# Patient Record
Sex: FEMALE | Race: WHITE | Employment: OTHER | URBAN - METROPOLITAN AREA
[De-identification: names, ages, dates, MRNs, and addresses within clinical notes are randomized per-mention and may not be internally consistent; named-entity substitution may affect disease eponyms.]

---

## 2017-02-20 ENCOUNTER — OFFICE VISIT (OUTPATIENT)
Dept: FAMILY MEDICINE CLINIC | Age: 67
End: 2017-02-20

## 2017-02-20 VITALS
DIASTOLIC BLOOD PRESSURE: 70 MMHG | HEIGHT: 65 IN | BODY MASS INDEX: 19.19 KG/M2 | SYSTOLIC BLOOD PRESSURE: 134 MMHG | WEIGHT: 115.2 LBS

## 2017-02-20 DIAGNOSIS — Z23 NEED FOR PNEUMOCOCCAL VACCINATION: ICD-10-CM

## 2017-02-20 DIAGNOSIS — Z00.00 HEALTH CARE MAINTENANCE: Primary | ICD-10-CM

## 2017-02-20 DIAGNOSIS — J40 BRONCHITIS: ICD-10-CM

## 2017-02-20 LAB
A/G RATIO: 1.4 (ref 1.1–2.2)
ALBUMIN SERPL-MCNC: 4.1 G/DL (ref 3.4–5)
ALP BLD-CCNC: 76 U/L (ref 40–129)
ALT SERPL-CCNC: 17 U/L (ref 10–40)
ANION GAP SERPL CALCULATED.3IONS-SCNC: 17 MMOL/L (ref 3–16)
AST SERPL-CCNC: 27 U/L (ref 15–37)
BILIRUB SERPL-MCNC: <0.2 MG/DL (ref 0–1)
BILIRUBIN, POC: ABNORMAL
BLOOD URINE, POC: ABNORMAL
BUN BLDV-MCNC: 12 MG/DL (ref 7–20)
CALCIUM SERPL-MCNC: 8.7 MG/DL (ref 8.3–10.6)
CHLORIDE BLD-SCNC: 99 MMOL/L (ref 99–110)
CHOLESTEROL, TOTAL: 180 MG/DL (ref 0–199)
CLARITY, POC: ABNORMAL
CO2: 24 MMOL/L (ref 21–32)
COLOR, POC: YELLOW
CREAT SERPL-MCNC: 0.8 MG/DL (ref 0.6–1.2)
GFR AFRICAN AMERICAN: >60
GFR NON-AFRICAN AMERICAN: >60
GLOBULIN: 2.9 G/DL
GLUCOSE BLD-MCNC: 96 MG/DL (ref 70–99)
GLUCOSE URINE, POC: ABNORMAL
HCT VFR BLD CALC: 38.5 % (ref 36–48)
HDLC SERPL-MCNC: 76 MG/DL (ref 40–60)
HEMOGLOBIN: 12.6 G/DL (ref 12–16)
KETONES, POC: ABNORMAL
LDL CHOLESTEROL CALCULATED: 88 MG/DL
LEUKOCYTE EST, POC: ABNORMAL
MCH RBC QN AUTO: 28.8 PG (ref 26–34)
MCHC RBC AUTO-ENTMCNC: 32.9 G/DL (ref 31–36)
MCV RBC AUTO: 87.6 FL (ref 80–100)
NITRITE, POC: ABNORMAL
PDW BLD-RTO: 12.5 % (ref 12.4–15.4)
PH, POC: 5.5
PLATELET # BLD: 203 K/UL (ref 135–450)
PMV BLD AUTO: 8.7 FL (ref 5–10.5)
POTASSIUM SERPL-SCNC: 4.2 MMOL/L (ref 3.5–5.1)
PROTEIN, POC: ABNORMAL
RBC # BLD: 4.39 M/UL (ref 4–5.2)
SODIUM BLD-SCNC: 140 MMOL/L (ref 136–145)
SPECIFIC GRAVITY, POC: 1
TOTAL PROTEIN: 7 G/DL (ref 6.4–8.2)
TRIGL SERPL-MCNC: 80 MG/DL (ref 0–150)
TSH SERPL DL<=0.05 MIU/L-ACNC: 2.19 UIU/ML (ref 0.27–4.2)
UROBILINOGEN, POC: 0.2
VITAMIN D 25-HYDROXY: 43.2 NG/ML
VLDLC SERPL CALC-MCNC: 16 MG/DL
WBC # BLD: 8.9 K/UL (ref 4–11)

## 2017-02-20 PROCEDURE — 90670 PCV13 VACCINE IM: CPT | Performed by: FAMILY MEDICINE

## 2017-02-20 PROCEDURE — 90471 IMMUNIZATION ADMIN: CPT | Performed by: FAMILY MEDICINE

## 2017-02-20 PROCEDURE — 99397 PER PM REEVAL EST PAT 65+ YR: CPT | Performed by: FAMILY MEDICINE

## 2017-02-20 PROCEDURE — 36415 COLL VENOUS BLD VENIPUNCTURE: CPT | Performed by: FAMILY MEDICINE

## 2017-02-20 PROCEDURE — 81002 URINALYSIS NONAUTO W/O SCOPE: CPT | Performed by: FAMILY MEDICINE

## 2017-02-20 RX ORDER — AZITHROMYCIN 250 MG/1
TABLET, FILM COATED ORAL
Qty: 1 PACKET | Refills: 0 | Status: SHIPPED | OUTPATIENT
Start: 2017-02-20 | End: 2017-03-02

## 2017-02-20 ASSESSMENT — ENCOUNTER SYMPTOMS
ABDOMINAL PAIN: 0
VOMITING: 0
DIARRHEA: 0
SORE THROAT: 0
WHEEZING: 0
COUGH: 0
SHORTNESS OF BREATH: 0
BLOOD IN STOOL: 0
EYE PAIN: 0
TROUBLE SWALLOWING: 0
BACK PAIN: 0

## 2017-02-20 ASSESSMENT — PATIENT HEALTH QUESTIONNAIRE - PHQ9
2. FEELING DOWN, DEPRESSED OR HOPELESS: 0
1. LITTLE INTEREST OR PLEASURE IN DOING THINGS: 1
SUM OF ALL RESPONSES TO PHQ9 QUESTIONS 1 & 2: 1
SUM OF ALL RESPONSES TO PHQ QUESTIONS 1-9: 1

## 2017-04-20 ENCOUNTER — OFFICE VISIT (OUTPATIENT)
Dept: ENT CLINIC | Age: 67
End: 2017-04-20

## 2017-04-20 DIAGNOSIS — H91.93 HEARING LOSS, BILATERAL: Primary | ICD-10-CM

## 2017-06-20 ENCOUNTER — OFFICE VISIT (OUTPATIENT)
Dept: ENT CLINIC | Age: 67
End: 2017-06-20

## 2017-06-20 DIAGNOSIS — H90.5 HEARING LOSS, SENSORINEURAL: Primary | ICD-10-CM

## 2017-07-10 ENCOUNTER — OFFICE VISIT (OUTPATIENT)
Dept: ENT CLINIC | Age: 67
End: 2017-07-10

## 2017-07-10 DIAGNOSIS — H91.93 HEARING LOSS, BILATERAL: Primary | ICD-10-CM

## 2017-07-11 ENCOUNTER — OFFICE VISIT (OUTPATIENT)
Dept: ENT CLINIC | Age: 67
End: 2017-07-11

## 2017-07-11 VITALS — BODY MASS INDEX: 19.29 KG/M2 | HEIGHT: 66 IN | WEIGHT: 120 LBS

## 2017-07-11 DIAGNOSIS — H61.21 EXCESSIVE CERUMEN IN EAR CANAL, RIGHT: Primary | ICD-10-CM

## 2017-07-11 DIAGNOSIS — Z97.4 PRESENCE OF EXTERNAL HEARING AID: ICD-10-CM

## 2017-07-11 PROBLEM — H61.20 EXCESSIVE CERUMEN IN EAR CANAL: Status: ACTIVE | Noted: 2017-07-11

## 2017-07-11 PROCEDURE — 99213 OFFICE O/P EST LOW 20 MIN: CPT | Performed by: OTOLARYNGOLOGY

## 2017-12-29 ENCOUNTER — OFFICE VISIT (OUTPATIENT)
Dept: ENT CLINIC | Age: 67
End: 2017-12-29

## 2017-12-29 VITALS — WEIGHT: 120 LBS | SYSTOLIC BLOOD PRESSURE: 120 MMHG | DIASTOLIC BLOOD PRESSURE: 70 MMHG | BODY MASS INDEX: 19.37 KG/M2

## 2017-12-29 DIAGNOSIS — H61.21 EXCESSIVE CERUMEN IN RIGHT EAR CANAL: Primary | ICD-10-CM

## 2017-12-29 PROCEDURE — 69210 REMOVE IMPACTED EAR WAX UNI: CPT | Performed by: OTOLARYNGOLOGY

## 2017-12-29 NOTE — PROGRESS NOTES
The patient had again been having squealing when she placed her right hearing aid  The audiologist that suspected a cerumen buildup  There has been no cotton applicator use swimming. She will be in [place] this February  She denies otalgia, otorrhea, tinnitus    She also wears a left-sided hearing aid but there has been no secondary noise on that side        This is not a recurring problem               I was unable to visualize  right tympanic membrane  due to excessive cerumen and debris in the ear canal. The ear was not sensitive to touch. There was not jennifer auricular involvement. The right ear canal was cleaned of cerumen, keratinaceous and squamous debis by curettage. The patient tolerated this well. Following removal, the external auditory canal was normal. The tympanic membrane was intact and there was good aeration of the middle ear space. Immediate subjective relief noted after removal.  With reinsertion of the hearing aid, there was no further squealing     IMPRESSION: Hearing aid malfunction secondary to Cerumen impaction, right, resolved after cleaning    PLAN: Long term care of the ear canal was discussed. Earwax suggestions   1. Do not allow Q- tips to enter your ear canal   2. Place 2 or 3 drops of rubbing alcohol in each ear monthly. 3. Allow drops to stay in for a minute per side, then use cotton ball to \"mop up. \"

## 2018-01-15 ENCOUNTER — OFFICE VISIT (OUTPATIENT)
Dept: ENT CLINIC | Age: 68
End: 2018-01-15

## 2018-01-15 DIAGNOSIS — H90.3 SENSORINEURAL HEARING LOSS (SNHL) OF BOTH EARS: Primary | ICD-10-CM

## 2018-01-15 NOTE — PROGRESS NOTES
Subjective:      Patient ID: Mallory Pillai is a 75 y.o. female. HPI    Review of Systems    Objective:   Physical Exam    Assessment:      Patient in for routine hearing aid check. Doing well overall. Aids underwent thorough cleaning and inspection.       Plan:      PRN

## 2018-05-09 ENCOUNTER — OFFICE VISIT (OUTPATIENT)
Dept: AUDIOLOGY | Age: 68
End: 2018-05-09

## 2018-05-09 DIAGNOSIS — H90.3 SENSORINEURAL HEARING LOSS (SNHL) OF BOTH EARS: Primary | ICD-10-CM

## 2018-05-09 PROCEDURE — 99999 PR OFFICE/OUTPT VISIT,PROCEDURE ONLY: CPT | Performed by: AUDIOLOGIST

## 2018-05-18 ENCOUNTER — OFFICE VISIT (OUTPATIENT)
Dept: ENT CLINIC | Age: 68
End: 2018-05-18

## 2018-05-18 VITALS — HEART RATE: 72 BPM | SYSTOLIC BLOOD PRESSURE: 122 MMHG | DIASTOLIC BLOOD PRESSURE: 64 MMHG

## 2018-05-18 DIAGNOSIS — Z97.4 PRESENCE OF EXTERNAL HEARING AID: ICD-10-CM

## 2018-05-18 DIAGNOSIS — H61.21 EXCESSIVE CERUMEN IN RIGHT EAR CANAL: Primary | ICD-10-CM

## 2018-05-18 PROCEDURE — 69210 REMOVE IMPACTED EAR WAX UNI: CPT | Performed by: OTOLARYNGOLOGY

## 2018-07-25 ENCOUNTER — OFFICE VISIT (OUTPATIENT)
Dept: ENT CLINIC | Age: 68
End: 2018-07-25

## 2018-07-25 VITALS
BODY MASS INDEX: 18.99 KG/M2 | WEIGHT: 114 LBS | OXYGEN SATURATION: 97 % | HEIGHT: 65 IN | SYSTOLIC BLOOD PRESSURE: 124 MMHG | DIASTOLIC BLOOD PRESSURE: 72 MMHG | HEART RATE: 64 BPM

## 2018-07-25 DIAGNOSIS — Z97.4 PRESENCE OF EXTERNAL HEARING AID: Primary | ICD-10-CM

## 2018-07-25 PROCEDURE — 99212 OFFICE O/P EST SF 10 MIN: CPT | Performed by: OTOLARYNGOLOGY

## 2018-10-26 ENCOUNTER — TELEPHONE (OUTPATIENT)
Dept: FAMILY MEDICINE CLINIC | Age: 68
End: 2018-10-26

## 2018-11-06 ENCOUNTER — OFFICE VISIT (OUTPATIENT)
Dept: ENT CLINIC | Age: 68
End: 2018-11-06
Payer: MEDICARE

## 2018-11-06 VITALS
OXYGEN SATURATION: 98 % | SYSTOLIC BLOOD PRESSURE: 138 MMHG | HEIGHT: 65 IN | RESPIRATION RATE: 16 BRPM | DIASTOLIC BLOOD PRESSURE: 76 MMHG | BODY MASS INDEX: 18.99 KG/M2 | WEIGHT: 114 LBS | HEART RATE: 63 BPM

## 2018-11-06 DIAGNOSIS — H61.23 EXCESSIVE CERUMEN IN BOTH EAR CANALS: Primary | ICD-10-CM

## 2018-11-06 PROCEDURE — 69210 REMOVE IMPACTED EAR WAX UNI: CPT | Performed by: OTOLARYNGOLOGY

## 2018-11-07 ENCOUNTER — TELEPHONE (OUTPATIENT)
Dept: ENT CLINIC | Age: 68
End: 2018-11-07

## 2018-11-08 ENCOUNTER — NURSE ONLY (OUTPATIENT)
Dept: FAMILY MEDICINE CLINIC | Age: 68
End: 2018-11-08
Payer: MEDICARE

## 2018-11-08 DIAGNOSIS — Z00.00 HEALTH CARE MAINTENANCE: ICD-10-CM

## 2018-11-08 DIAGNOSIS — Z00.00 ANNUAL PHYSICAL EXAM: Primary | ICD-10-CM

## 2018-11-08 DIAGNOSIS — R92.0 BREAST MICROCALCIFICATIONS: ICD-10-CM

## 2018-11-08 LAB
A/G RATIO: 2.4 (ref 1.1–2.2)
ALBUMIN SERPL-MCNC: 4.7 G/DL (ref 3.4–5)
ALP BLD-CCNC: 78 U/L (ref 40–129)
ALT SERPL-CCNC: 16 U/L (ref 10–40)
ANION GAP SERPL CALCULATED.3IONS-SCNC: 12 MMOL/L (ref 3–16)
AST SERPL-CCNC: 27 U/L (ref 15–37)
BILIRUB SERPL-MCNC: 0.3 MG/DL (ref 0–1)
BILIRUBIN, POC: NORMAL
BLOOD URINE, POC: NORMAL
BUN BLDV-MCNC: 12 MG/DL (ref 7–20)
CALCIUM SERPL-MCNC: 9.2 MG/DL (ref 8.3–10.6)
CHLORIDE BLD-SCNC: 105 MMOL/L (ref 99–110)
CHOLESTEROL, TOTAL: 198 MG/DL (ref 0–199)
CLARITY, POC: NORMAL
CO2: 27 MMOL/L (ref 21–32)
COLOR, POC: NORMAL
CREAT SERPL-MCNC: 0.7 MG/DL (ref 0.6–1.2)
GFR AFRICAN AMERICAN: >60
GFR NON-AFRICAN AMERICAN: >60
GLOBULIN: 2 G/DL
GLUCOSE BLD-MCNC: 85 MG/DL (ref 70–99)
GLUCOSE URINE, POC: NORMAL
HCT VFR BLD CALC: 39.5 % (ref 36–48)
HDLC SERPL-MCNC: 77 MG/DL (ref 40–60)
HEMOGLOBIN: 12.9 G/DL (ref 12–16)
HEPATITIS C ANTIBODY INTERPRETATION: NORMAL
KETONES, POC: NORMAL
LDL CHOLESTEROL CALCULATED: 109 MG/DL
LEUKOCYTE EST, POC: NORMAL
MCH RBC QN AUTO: 29.2 PG (ref 26–34)
MCHC RBC AUTO-ENTMCNC: 32.8 G/DL (ref 31–36)
MCV RBC AUTO: 88.9 FL (ref 80–100)
NITRITE, POC: NORMAL
PDW BLD-RTO: 13.4 % (ref 12.4–15.4)
PH, POC: 6
PLATELET # BLD: 214 K/UL (ref 135–450)
PMV BLD AUTO: 8.4 FL (ref 5–10.5)
POTASSIUM SERPL-SCNC: 4.9 MMOL/L (ref 3.5–5.1)
PROTEIN, POC: NORMAL
RBC # BLD: 4.44 M/UL (ref 4–5.2)
SODIUM BLD-SCNC: 144 MMOL/L (ref 136–145)
SPECIFIC GRAVITY, POC: 1.01
TOTAL PROTEIN: 6.7 G/DL (ref 6.4–8.2)
TRIGL SERPL-MCNC: 61 MG/DL (ref 0–150)
TSH REFLEX: 1.84 UIU/ML (ref 0.27–4.2)
UROBILINOGEN, POC: 0.2
VLDLC SERPL CALC-MCNC: 12 MG/DL
WBC # BLD: 4.8 K/UL (ref 4–11)

## 2018-11-08 PROCEDURE — 36415 COLL VENOUS BLD VENIPUNCTURE: CPT | Performed by: FAMILY MEDICINE

## 2018-11-08 PROCEDURE — 81002 URINALYSIS NONAUTO W/O SCOPE: CPT | Performed by: FAMILY MEDICINE

## 2018-11-09 ENCOUNTER — OFFICE VISIT (OUTPATIENT)
Dept: ENT CLINIC | Age: 68
End: 2018-11-09
Payer: MEDICARE

## 2018-11-09 VITALS — HEART RATE: 57 BPM | DIASTOLIC BLOOD PRESSURE: 80 MMHG | OXYGEN SATURATION: 97 % | SYSTOLIC BLOOD PRESSURE: 160 MMHG

## 2018-11-09 DIAGNOSIS — S00.411D ABRASION OF RIGHT EAR CANAL, SUBSEQUENT ENCOUNTER: Primary | ICD-10-CM

## 2018-11-09 PROBLEM — S00.411A ABRASION OF RIGHT EAR CANAL: Status: ACTIVE | Noted: 2018-11-09

## 2018-11-09 PROCEDURE — 99213 OFFICE O/P EST LOW 20 MIN: CPT | Performed by: OTOLARYNGOLOGY

## 2018-11-12 ENCOUNTER — PROCEDURE VISIT (OUTPATIENT)
Dept: AUDIOLOGY | Age: 68
End: 2018-11-12
Payer: MEDICARE

## 2018-11-12 DIAGNOSIS — H90.3 SENSORINEURAL HEARING LOSS (SNHL) OF BOTH EARS: Primary | ICD-10-CM

## 2018-11-12 PROCEDURE — 92568 ACOUSTIC REFL THRESHOLD TST: CPT | Performed by: AUDIOLOGIST

## 2018-11-12 PROCEDURE — 92557 COMPREHENSIVE HEARING TEST: CPT | Performed by: AUDIOLOGIST

## 2018-11-13 ENCOUNTER — OFFICE VISIT (OUTPATIENT)
Dept: AUDIOLOGY | Age: 68
End: 2018-11-13

## 2018-11-13 ENCOUNTER — OFFICE VISIT (OUTPATIENT)
Dept: FAMILY MEDICINE CLINIC | Age: 68
End: 2018-11-13
Payer: MEDICARE

## 2018-11-13 VITALS
HEIGHT: 65 IN | SYSTOLIC BLOOD PRESSURE: 142 MMHG | WEIGHT: 120 LBS | DIASTOLIC BLOOD PRESSURE: 74 MMHG | BODY MASS INDEX: 19.99 KG/M2

## 2018-11-13 DIAGNOSIS — Z23 NEED FOR PNEUMOCOCCAL VACCINATION: ICD-10-CM

## 2018-11-13 DIAGNOSIS — Z00.00 HEALTHCARE MAINTENANCE: Primary | ICD-10-CM

## 2018-11-13 DIAGNOSIS — Z23 NEEDS FLU SHOT: ICD-10-CM

## 2018-11-13 DIAGNOSIS — H90.3 SENSORINEURAL HEARING LOSS OF BOTH EARS: Primary | ICD-10-CM

## 2018-11-13 PROBLEM — S00.411A ABRASION OF RIGHT EAR CANAL: Status: RESOLVED | Noted: 2018-11-09 | Resolved: 2018-11-13

## 2018-11-13 PROCEDURE — G0008 ADMIN INFLUENZA VIRUS VAC: HCPCS | Performed by: FAMILY MEDICINE

## 2018-11-13 PROCEDURE — 90732 PPSV23 VACC 2 YRS+ SUBQ/IM: CPT | Performed by: FAMILY MEDICINE

## 2018-11-13 PROCEDURE — 99397 PER PM REEVAL EST PAT 65+ YR: CPT | Performed by: FAMILY MEDICINE

## 2018-11-13 PROCEDURE — 99999 PR OFFICE/OUTPT VISIT,PROCEDURE ONLY: CPT | Performed by: AUDIOLOGIST

## 2018-11-13 PROCEDURE — G0009 ADMIN PNEUMOCOCCAL VACCINE: HCPCS | Performed by: FAMILY MEDICINE

## 2018-11-13 PROCEDURE — 90662 IIV NO PRSV INCREASED AG IM: CPT | Performed by: FAMILY MEDICINE

## 2018-11-13 ASSESSMENT — ENCOUNTER SYMPTOMS
SHORTNESS OF BREATH: 0
BLOOD IN STOOL: 0
DIARRHEA: 0
ABDOMINAL PAIN: 0
BACK PAIN: 0
WHEEZING: 0
COUGH: 0
SORE THROAT: 0
VOMITING: 0
TROUBLE SWALLOWING: 0
EYE PAIN: 0

## 2018-11-13 ASSESSMENT — PATIENT HEALTH QUESTIONNAIRE - PHQ9
SUM OF ALL RESPONSES TO PHQ QUESTIONS 1-9: 0
SUM OF ALL RESPONSES TO PHQ9 QUESTIONS 1 & 2: 0
2. FEELING DOWN, DEPRESSED OR HOPELESS: 0
1. LITTLE INTEREST OR PLEASURE IN DOING THINGS: 0
SUM OF ALL RESPONSES TO PHQ QUESTIONS 1-9: 0

## 2018-11-13 NOTE — PROGRESS NOTES
Vaccine Information Sheet, \"Influenza - Inactivated\"  given to Kaelyn Padilla, or parent/legal guardian of  Kaelyn Padilla and verbalized understanding. Patient responses:    Have you ever had a reaction to a flu vaccine? No  Are you able to eat eggs without adverse effects? Yes  Do you have any current illness? No  Have you ever had [place] Syndrome? No    Flu vaccine given per order. Please see immunization tab.

## 2019-01-30 ENCOUNTER — OFFICE VISIT (OUTPATIENT)
Dept: ENT CLINIC | Age: 69
End: 2019-01-30
Payer: MEDICARE

## 2019-01-30 VITALS — SYSTOLIC BLOOD PRESSURE: 138 MMHG | OXYGEN SATURATION: 98 % | DIASTOLIC BLOOD PRESSURE: 72 MMHG | HEART RATE: 65 BPM

## 2019-01-30 DIAGNOSIS — H61.21 IMPACTED CERUMEN OF RIGHT EAR: Primary | ICD-10-CM

## 2019-01-30 PROCEDURE — 69210 REMOVE IMPACTED EAR WAX UNI: CPT | Performed by: OTOLARYNGOLOGY

## 2019-02-04 ENCOUNTER — TELEPHONE (OUTPATIENT)
Dept: FAMILY MEDICINE CLINIC | Age: 69
End: 2019-02-04

## 2019-05-20 RX ORDER — ATOVAQUONE AND PROGUANIL HYDROCHLORIDE 250; 100 MG/1; MG/1
1 TABLET, FILM COATED ORAL DAILY
Qty: 30 TABLET | Refills: 0 | Status: SHIPPED | OUTPATIENT
Start: 2019-05-20 | End: 2019-08-06

## 2019-05-28 ENCOUNTER — OFFICE VISIT (OUTPATIENT)
Dept: ENT CLINIC | Age: 69
End: 2019-05-28
Payer: MEDICARE

## 2019-05-28 VITALS — HEART RATE: 82 BPM | OXYGEN SATURATION: 98 % | DIASTOLIC BLOOD PRESSURE: 70 MMHG | SYSTOLIC BLOOD PRESSURE: 136 MMHG

## 2019-05-28 DIAGNOSIS — H61.21 IMPACTED CERUMEN OF RIGHT EAR: Primary | ICD-10-CM

## 2019-05-28 DIAGNOSIS — H90.3 SENSORINEURAL HEARING LOSS (SNHL) OF BOTH EARS: ICD-10-CM

## 2019-05-28 PROCEDURE — 69210 REMOVE IMPACTED EAR WAX UNI: CPT | Performed by: OTOLARYNGOLOGY

## 2019-05-28 PROCEDURE — 99212 OFFICE O/P EST SF 10 MIN: CPT | Performed by: OTOLARYNGOLOGY

## 2019-08-06 ENCOUNTER — OFFICE VISIT (OUTPATIENT)
Dept: ENT CLINIC | Age: 69
End: 2019-08-06
Payer: MEDICARE

## 2019-08-06 VITALS — SYSTOLIC BLOOD PRESSURE: 132 MMHG | HEART RATE: 76 BPM | OXYGEN SATURATION: 94 % | DIASTOLIC BLOOD PRESSURE: 70 MMHG

## 2019-08-06 DIAGNOSIS — H61.23 BILATERAL IMPACTED CERUMEN: Primary | ICD-10-CM

## 2019-08-06 PROCEDURE — 69210 REMOVE IMPACTED EAR WAX UNI: CPT | Performed by: OTOLARYNGOLOGY

## 2019-08-06 NOTE — PROGRESS NOTES
Patient wears hearing aids and is concerned about the potential of cerumen accumulation somewhat greater on the right than the left. No pain is been noted and there is been no change in hearing or tinnitus. No vertigo has been noted. Currently, she appears in no obvious acute distress. Ear examination does reveal cerumen presence in both ear canals removed with irrigation, suctioning, curettage, and cleaning with peroxide. Upon removal, the tympanic membranes appear normal and ear canals are now clear. Oral examination reveals normal appearance of the mucosa and no lesions noted. The nasal mucosa is mildly edematous consistent with some allergy but no purulence is apparent and there is no obstruction present. The neck is free of any adenopathy, mass, thyroid enlargement. Patient will return as needed.

## 2019-08-26 ENCOUNTER — TELEPHONE (OUTPATIENT)
Dept: ENT CLINIC | Age: 69
End: 2019-08-26

## 2019-08-26 DIAGNOSIS — T70.29XA BAROTRAUMA, INITIAL ENCOUNTER: Primary | ICD-10-CM

## 2019-08-26 RX ORDER — METHYLPREDNISOLONE 4 MG/1
4 TABLET ORAL SEE ADMIN INSTRUCTIONS
Qty: 1 KIT | Refills: 0 | Status: SHIPPED | OUTPATIENT
Start: 2019-08-26 | End: 2019-12-18

## 2019-08-26 NOTE — TELEPHONE ENCOUNTER
PT called again, is on malaria pills atovaquone -proguanil and for typhoid meds -vivotis , also supposed to get another hep a&b shot end of the month. She wants to be sure its ok to take the steroid you gave her with these meds.

## 2019-09-30 ENCOUNTER — OFFICE VISIT (OUTPATIENT)
Dept: FAMILY MEDICINE CLINIC | Age: 69
End: 2019-09-30
Payer: MEDICARE

## 2019-09-30 VITALS
SYSTOLIC BLOOD PRESSURE: 140 MMHG | DIASTOLIC BLOOD PRESSURE: 80 MMHG | HEIGHT: 65 IN | BODY MASS INDEX: 19.33 KG/M2 | WEIGHT: 116 LBS

## 2019-09-30 DIAGNOSIS — S56.911A STRAIN OF RIGHT FOREARM, INITIAL ENCOUNTER: Primary | ICD-10-CM

## 2019-09-30 DIAGNOSIS — N30.00 ACUTE CYSTITIS WITHOUT HEMATURIA: ICD-10-CM

## 2019-09-30 LAB
BILIRUBIN, POC: ABNORMAL
BLOOD URINE, POC: ABNORMAL
CLARITY, POC: ABNORMAL
COLOR, POC: YELLOW
GLUCOSE URINE, POC: ABNORMAL
KETONES, POC: ABNORMAL
LEUKOCYTE EST, POC: ABNORMAL
NITRITE, POC: ABNORMAL
PH, POC: 6
PROTEIN, POC: 30
SPECIFIC GRAVITY, POC: 1.02
UROBILINOGEN, POC: 0.2

## 2019-09-30 PROCEDURE — 99213 OFFICE O/P EST LOW 20 MIN: CPT | Performed by: FAMILY MEDICINE

## 2019-09-30 PROCEDURE — 81002 URINALYSIS NONAUTO W/O SCOPE: CPT | Performed by: FAMILY MEDICINE

## 2019-09-30 RX ORDER — SULFAMETHOXAZOLE AND TRIMETHOPRIM 800; 160 MG/1; MG/1
1 TABLET ORAL 2 TIMES DAILY
Qty: 14 TABLET | Refills: 0 | Status: SHIPPED | OUTPATIENT
Start: 2019-09-30 | End: 2019-10-07

## 2019-09-30 RX ORDER — IBUPROFEN 600 MG/1
600 TABLET ORAL 3 TIMES DAILY PRN
Qty: 30 TABLET | Refills: 0 | Status: SHIPPED | OUTPATIENT
Start: 2019-09-30 | End: 2019-12-18

## 2019-09-30 ASSESSMENT — PATIENT HEALTH QUESTIONNAIRE - PHQ9
1. LITTLE INTEREST OR PLEASURE IN DOING THINGS: 0
2. FEELING DOWN, DEPRESSED OR HOPELESS: 0
SUM OF ALL RESPONSES TO PHQ9 QUESTIONS 1 & 2: 0
SUM OF ALL RESPONSES TO PHQ QUESTIONS 1-9: 0
SUM OF ALL RESPONSES TO PHQ QUESTIONS 1-9: 0

## 2019-09-30 ASSESSMENT — ENCOUNTER SYMPTOMS
COUGH: 0
WHEEZING: 0
EYE PAIN: 0
ABDOMINAL PAIN: 0
SHORTNESS OF BREATH: 0

## 2019-09-30 NOTE — PROGRESS NOTES
Subjective:      Patient ID: Hui Niño is a 78 y.o. female. HPI  Forearm injury  Soreness in forearm    Recent trip [place]    Had to lift suitcase in overhead compartment freq    Now pain in flexor extensor muscles right arm          Poss uti    ua has large wbc and rbc    Dysuria onset 4 day ago   freq and urgency   noo fevers or chills    Review of Systems   Constitutional: Negative for appetite change, fatigue and unexpected weight change. Eyes: Negative for pain and visual disturbance. Respiratory: Negative for cough, shortness of breath and wheezing. Cardiovascular: Negative for chest pain, palpitations and leg swelling. Gastrointestinal: Negative for abdominal pain. Endocrine: Negative for polyuria. Genitourinary: Positive for dysuria and frequency. Negative for difficulty urinating. Musculoskeletal: Positive for myalgias. Neurological: Negative for speech difficulty, numbness and headaches. Psychiatric/Behavioral: Negative for confusion and sleep disturbance. A complete 14 point  review of systems was completed; pertinent positives are noted in HPI    Vitals:    09/30/19 0939   Weight: 116 lb (52.6 kg)   Height: 5' 5\" (1.651 m)     Body mass index is 19.3 kg/m². Wt Readings from Last 3 Encounters:   09/30/19 116 lb (52.6 kg)   11/13/18 120 lb (54.4 kg)   11/06/18 114 lb (51.7 kg)     BP Readings from Last 3 Encounters:   08/06/19 132/70   05/28/19 136/70   01/30/19 138/72        Ht 5' 5\" (1.651 m)   Wt 116 lb (52.6 kg)   BMI 19.30 kg/m²    Objective:   Physical Exam   Constitutional: She is oriented to person, place, and time. She appears well-nourished. No distress. Cardiovascular: Normal rate and regular rhythm. Pulmonary/Chest: Effort normal and breath sounds normal.   Abdominal: Soft. Bowel sounds are normal. There is no tenderness.    No flank pain     Musculoskeletal:   Right forearm tender at lateral and medical epicondyle   good wrist and elbow motpon   pain

## 2019-10-02 LAB
ORGANISM: ABNORMAL
URINE CULTURE, ROUTINE: ABNORMAL

## 2019-10-21 ENCOUNTER — TELEPHONE (OUTPATIENT)
Dept: ORTHOPEDIC SURGERY | Age: 69
End: 2019-10-21

## 2019-12-13 ENCOUNTER — TELEPHONE (OUTPATIENT)
Dept: FAMILY MEDICINE CLINIC | Age: 69
End: 2019-12-13

## 2019-12-13 ENCOUNTER — TELEPHONE (OUTPATIENT)
Dept: ENT CLINIC | Age: 69
End: 2019-12-13

## 2019-12-13 NOTE — TELEPHONE ENCOUNTER
Patient called , she is saying that she is having Dizziness for the first time     It happened today while she was at [employer] class, she started spinning bad,     Mainly her right side,  She is concerned about trying to sleep tonight? She was advised that Dr Delwyn Severs was not in the office today,      Shahnaz Greene patient has an appointment for next wed?   Please advise    WG

## 2019-12-18 ENCOUNTER — OFFICE VISIT (OUTPATIENT)
Dept: ENT CLINIC | Age: 69
End: 2019-12-18
Payer: MEDICARE

## 2019-12-18 VITALS — OXYGEN SATURATION: 96 % | SYSTOLIC BLOOD PRESSURE: 150 MMHG | HEART RATE: 65 BPM | DIASTOLIC BLOOD PRESSURE: 82 MMHG

## 2019-12-18 DIAGNOSIS — H61.23 EXCESSIVE CERUMEN IN BOTH EAR CANALS: Primary | ICD-10-CM

## 2019-12-18 DIAGNOSIS — H81.10 BENIGN PAROXYSMAL POSITIONAL VERTIGO, UNSPECIFIED LATERALITY: ICD-10-CM

## 2019-12-18 PROCEDURE — 69210 REMOVE IMPACTED EAR WAX UNI: CPT | Performed by: OTOLARYNGOLOGY

## 2019-12-18 PROCEDURE — 99212 OFFICE O/P EST SF 10 MIN: CPT | Performed by: OTOLARYNGOLOGY

## 2020-06-25 ENCOUNTER — OFFICE VISIT (OUTPATIENT)
Dept: ENT CLINIC | Age: 70
End: 2020-06-25
Payer: MEDICARE

## 2020-06-25 VITALS — DIASTOLIC BLOOD PRESSURE: 78 MMHG | SYSTOLIC BLOOD PRESSURE: 175 MMHG | HEART RATE: 79 BPM | TEMPERATURE: 97.8 F

## 2020-06-25 PROCEDURE — 69210 REMOVE IMPACTED EAR WAX UNI: CPT | Performed by: OTOLARYNGOLOGY

## 2020-06-26 NOTE — PROGRESS NOTES
Patient has been having routine cleaning of her ears secondary to accumulation of cerumen in the past.  She is feeling that it is now presents once again with some limitation of her hearing. There is no change in tinnitus and she has had no vertigo. There is been no fever. Currently, she appears in no acute distress. Ear examination does reveal cerumen present in both ear canals occluding them. This is removed with a combination of irrigation, suctioning, curettage, and cleaning with peroxide. Upon removal, the tympanic membranes appear normal and the ear canals are now clear. Oral examination remains unremarkable. The nasal mucosa is normal as well. The neck is free of any adenopathy, mass, thyroid enlargement. She will return as needed for future removal of cerumen.

## 2020-10-05 ENCOUNTER — TELEPHONE (OUTPATIENT)
Dept: ENT CLINIC | Age: 70
End: 2020-10-05

## 2020-10-05 NOTE — TELEPHONE ENCOUNTER
Patient just stopped in the office, she says that she has LM's for Dr Nesha Polanco,    She is in need of domes,  Little black rubber piece, for her HA's

## 2020-11-19 ENCOUNTER — OFFICE VISIT (OUTPATIENT)
Dept: ENT CLINIC | Age: 70
End: 2020-11-19
Payer: MEDICARE

## 2020-11-19 VITALS
HEART RATE: 97 BPM | BODY MASS INDEX: 19.33 KG/M2 | TEMPERATURE: 96.9 F | WEIGHT: 116 LBS | DIASTOLIC BLOOD PRESSURE: 81 MMHG | RESPIRATION RATE: 16 BRPM | OXYGEN SATURATION: 96 % | HEIGHT: 65 IN | SYSTOLIC BLOOD PRESSURE: 170 MMHG

## 2020-11-19 PROCEDURE — 69210 REMOVE IMPACTED EAR WAX UNI: CPT | Performed by: OTOLARYNGOLOGY

## 2020-11-20 ENCOUNTER — TELEPHONE (OUTPATIENT)
Dept: FAMILY MEDICINE CLINIC | Age: 70
End: 2020-11-20

## 2020-11-20 NOTE — TELEPHONE ENCOUNTER
Patient request [address] lab orders to be placed as future for her upcoming physical on 12/8/20. Please call patient once lab orders placed.

## 2020-11-30 DIAGNOSIS — Z79.899 ENCOUNTER FOR LONG-TERM (CURRENT) USE OF MEDICATIONS: ICD-10-CM

## 2020-11-30 DIAGNOSIS — M85.80 OSTEOPENIA, UNSPECIFIED LOCATION: ICD-10-CM

## 2020-11-30 LAB
A/G RATIO: 2 (ref 1.1–2.2)
ALBUMIN SERPL-MCNC: 4.5 G/DL (ref 3.4–5)
ALP BLD-CCNC: 87 U/L (ref 40–129)
ALT SERPL-CCNC: 14 U/L (ref 10–40)
ANION GAP SERPL CALCULATED.3IONS-SCNC: 9 MMOL/L (ref 3–16)
AST SERPL-CCNC: 23 U/L (ref 15–37)
BACTERIA: ABNORMAL /HPF
BILIRUB SERPL-MCNC: 0.3 MG/DL (ref 0–1)
BILIRUBIN URINE: NEGATIVE
BLOOD, URINE: ABNORMAL
BUN BLDV-MCNC: 14 MG/DL (ref 7–20)
CALCIUM SERPL-MCNC: 9.4 MG/DL (ref 8.3–10.6)
CHLORIDE BLD-SCNC: 102 MMOL/L (ref 99–110)
CHOLESTEROL, TOTAL: 197 MG/DL (ref 0–199)
CLARITY: ABNORMAL
CO2: 30 MMOL/L (ref 21–32)
COLOR: YELLOW
CREAT SERPL-MCNC: 0.6 MG/DL (ref 0.6–1.2)
EPITHELIAL CELLS, UA: 1 /HPF (ref 0–5)
GFR AFRICAN AMERICAN: >60
GFR NON-AFRICAN AMERICAN: >60
GLOBULIN: 2.3 G/DL
GLUCOSE BLD-MCNC: 96 MG/DL (ref 70–99)
GLUCOSE URINE: NEGATIVE MG/DL
HCT VFR BLD CALC: 40.2 % (ref 36–48)
HDLC SERPL-MCNC: 77 MG/DL (ref 40–60)
HEMOGLOBIN: 13.7 G/DL (ref 12–16)
HYALINE CASTS: 0 /LPF (ref 0–8)
KETONES, URINE: NEGATIVE MG/DL
LDL CHOLESTEROL CALCULATED: 108 MG/DL
LEUKOCYTE ESTERASE, URINE: ABNORMAL
MCH RBC QN AUTO: 30.4 PG (ref 26–34)
MCHC RBC AUTO-ENTMCNC: 34 G/DL (ref 31–36)
MCV RBC AUTO: 89.4 FL (ref 80–100)
MICROSCOPIC EXAMINATION: YES
NITRITE, URINE: POSITIVE
PDW BLD-RTO: 12.6 % (ref 12.4–15.4)
PH UA: 6.5 (ref 5–8)
PLATELET # BLD: 189 K/UL (ref 135–450)
PMV BLD AUTO: 8.7 FL (ref 5–10.5)
POTASSIUM SERPL-SCNC: 3.9 MMOL/L (ref 3.5–5.1)
PROTEIN UA: NEGATIVE MG/DL
RBC # BLD: 4.49 M/UL (ref 4–5.2)
RBC UA: 5 /HPF (ref 0–4)
SODIUM BLD-SCNC: 141 MMOL/L (ref 136–145)
SPECIFIC GRAVITY UA: 1.01 (ref 1–1.03)
TOTAL PROTEIN: 6.8 G/DL (ref 6.4–8.2)
TRIGL SERPL-MCNC: 61 MG/DL (ref 0–150)
TSH SERPL DL<=0.05 MIU/L-ACNC: 3.27 UIU/ML (ref 0.27–4.2)
URINE TYPE: ABNORMAL
UROBILINOGEN, URINE: 0.2 E.U./DL
VITAMIN D 25-HYDROXY: 38.4 NG/ML
VLDLC SERPL CALC-MCNC: 12 MG/DL
WBC # BLD: 5.7 K/UL (ref 4–11)
WBC UA: 67 /HPF (ref 0–5)

## 2020-12-01 RX ORDER — SULFAMETHOXAZOLE AND TRIMETHOPRIM 800; 160 MG/1; MG/1
1 TABLET ORAL 2 TIMES DAILY
Qty: 10 TABLET | Refills: 0 | Status: SHIPPED | OUTPATIENT
Start: 2020-12-01 | End: 2020-12-06

## 2020-12-07 ASSESSMENT — ENCOUNTER SYMPTOMS
SHORTNESS OF BREATH: 0
TROUBLE SWALLOWING: 0
ABDOMINAL PAIN: 0
DIARRHEA: 0
WHEEZING: 0
COUGH: 0
EYE PAIN: 0
BACK PAIN: 0
VOMITING: 0
BLOOD IN STOOL: 0
SORE THROAT: 0

## 2020-12-07 NOTE — PROGRESS NOTES
Subjective:      Patient ID: Alondra Rose is a 69 y.o. female. HPI  Health maintenence exam  Been doing well   no new complaints    Denies chst apin or sob no abd pain no bowel changes    No Known Allergies  No current outpatient medications on file. No current facility-administered medications for this visit. Past Medical History:   Diagnosis Date    Basal cell cancer     Preventative health care     Raynaud's disease      Past Surgical History:   Procedure Laterality Date    COLONOSCOPY  2008    FINGER TRIGGER RELEASE Right 5/27/14    ring finger    SKIN CANCER EXCISION       Social History     Tobacco Use    Smoking status: Never Smoker    Smokeless tobacco: Never Used   Substance Use Topics    Alcohol use: Yes     Comment: occassionally    Drug use: No     Family History   Problem Relation Age of Onset    High Blood Pressure Unknown     Heart Disease Unknown          Review of Systems   Constitutional: Negative for appetite change, fatigue and unexpected weight change. HENT: Negative for congestion, postnasal drip, sore throat and trouble swallowing. Eyes: Negative for pain and visual disturbance. Respiratory: Negative for cough, shortness of breath and wheezing. Cardiovascular: Negative for chest pain and palpitations. Gastrointestinal: Negative for abdominal pain, blood in stool, diarrhea and vomiting. Genitourinary: Negative for difficulty urinating, dysuria, frequency and urgency. Musculoskeletal: Negative for arthralgias, back pain, joint swelling and neck pain. Skin: Negative for rash. Neurological: Negative for dizziness, weakness and light-headedness. Hematological: Negative for adenopathy. Does not bruise/bleed easily. Psychiatric/Behavioral: Negative for sleep disturbance. The patient is not nervous/anxious.       A complete 14 point  review of systems was completed; pertinent positives are noted in HPI    Vitals:    12/08/20 1028   BP: (!) 144/70   Temp: 97.3 °F (36.3 °C)   Weight: 114 lb 9.6 oz (52 kg)   Height: 5' 5\" (1.651 m)     Body mass index is 19.07 kg/m². Wt Readings from Last 3 Encounters:   12/08/20 114 lb 9.6 oz (52 kg)   11/19/20 116 lb (52.6 kg)   09/30/19 116 lb (52.6 kg)     BP Readings from Last 3 Encounters:   12/08/20 (!) 144/70   11/19/20 (!) 170/81   06/25/20 (!) 175/78        BP (!) 144/70   Temp 97.3 °F (36.3 °C)   Ht 5' 5\" (1.651 m)   Wt 114 lb 9.6 oz (52 kg)   BMI 19.07 kg/m²    Objective:   Physical Exam  Constitutional:       General: She is not in acute distress. Appearance: She is well-developed. HENT:      Right Ear: External ear normal.      Left Ear: External ear normal.      Nose: Nose normal.   Eyes:      General: No scleral icterus. Conjunctiva/sclera: Conjunctivae normal.   Neck:      Musculoskeletal: Neck supple. Thyroid: No thyromegaly. Cardiovascular:      Rate and Rhythm: Normal rate and regular rhythm. Heart sounds: Normal heart sounds. No murmur. Pulmonary:      Effort: Pulmonary effort is normal. No respiratory distress. Breath sounds: Normal breath sounds. Abdominal:      General: Bowel sounds are normal.      Palpations: Abdomen is soft. Tenderness: There is no abdominal tenderness. Lymphadenopathy:      Cervical: No cervical adenopathy. Skin:     General: Skin is warm. Findings: No rash. Neurological:      Mental Status: She is alert and oriented to person, place, and time. Psychiatric:         Thought Content:  Thought content normal.         Assessment:      Assessment/Plan:  Wilma Salinas was seen today for annual exam.    Diagnoses and all orders for this visit:    Healthcare maintenance, good health    Presbycusis of both ears    Acute cystitis with hematuria, resolved with bactrim            Plan:      Reviewed lab results with patient in detail, both normal and abnormal and recommended plan of care  No need for meds  rto annually          [address], DO

## 2020-12-08 ENCOUNTER — OFFICE VISIT (OUTPATIENT)
Dept: FAMILY MEDICINE CLINIC | Age: 70
End: 2020-12-08
Payer: MEDICARE

## 2020-12-08 VITALS
TEMPERATURE: 97.3 F | HEIGHT: 65 IN | WEIGHT: 114.6 LBS | SYSTOLIC BLOOD PRESSURE: 144 MMHG | BODY MASS INDEX: 19.09 KG/M2 | DIASTOLIC BLOOD PRESSURE: 70 MMHG

## 2020-12-08 LAB
BILIRUBIN, POC: ABNORMAL
BLOOD URINE, POC: ABNORMAL
CLARITY, POC: ABNORMAL
COLOR, POC: YELLOW
GLUCOSE URINE, POC: ABNORMAL
KETONES, POC: ABNORMAL
LEUKOCYTE EST, POC: ABNORMAL
NITRITE, POC: ABNORMAL
PH, POC: 5.5
PROTEIN, POC: ABNORMAL
SPECIFIC GRAVITY, POC: 1.01
UROBILINOGEN, POC: 0.2

## 2020-12-08 PROCEDURE — 81002 URINALYSIS NONAUTO W/O SCOPE: CPT | Performed by: FAMILY MEDICINE

## 2020-12-08 PROCEDURE — 99397 PER PM REEVAL EST PAT 65+ YR: CPT | Performed by: FAMILY MEDICINE

## 2020-12-08 ASSESSMENT — PATIENT HEALTH QUESTIONNAIRE - PHQ9
SUM OF ALL RESPONSES TO PHQ QUESTIONS 1-9: 0
1. LITTLE INTEREST OR PLEASURE IN DOING THINGS: 0
2. FEELING DOWN, DEPRESSED OR HOPELESS: 0
SUM OF ALL RESPONSES TO PHQ QUESTIONS 1-9: 0
SUM OF ALL RESPONSES TO PHQ QUESTIONS 1-9: 0
SUM OF ALL RESPONSES TO PHQ9 QUESTIONS 1 & 2: 0

## 2020-12-11 ENCOUNTER — TELEPHONE (OUTPATIENT)
Dept: FAMILY MEDICINE CLINIC | Age: 70
End: 2020-12-11

## 2020-12-11 NOTE — TELEPHONE ENCOUNTER
Pt called back  States she is going to [facility]  I told her to call the to do Prior auth for PT

## 2020-12-11 NOTE — TELEPHONE ENCOUNTER
Patient states [facility] is requiring a pre-authorization for outpatient Physical Therapy. Patient is wanting to start PT asap. Patient requesting a returned call once pre-auth has gone through.

## 2020-12-28 ENCOUNTER — HOSPITAL ENCOUNTER (OUTPATIENT)
Dept: PHYSICAL THERAPY | Age: 70
Setting detail: THERAPIES SERIES
Discharge: HOME | End: 2020-12-28
Payer: MEDICARE

## 2020-12-28 PROCEDURE — 97161 PT EVAL LOW COMPLEX 20 MIN: CPT

## 2020-12-28 PROCEDURE — 97110 THERAPEUTIC EXERCISES: CPT

## 2020-12-28 NOTE — FLOWSHEET NOTE
[facility]  Outpatient Physical Therapy  Phone: [phone]   Fax: [phone]    Physical Therapy Daily Treatment Note  Date:  2020    Patient Name:  Juan Manuel Siddiqui    :  1950  MRN: [id number]  Medical/Treatment Diagnosis Information:  Diagnosis: M77.8 (ICD-10-CM) - Tendonitis of elbow, right  Treatment Diagnosis: decreased GHJ IR, impaired posture, increased tension in scapulothoraic mm  Insurance/Certification information:  PT Insurance Information: [id number] [employer] (auth required through [employer])  Physician Information:  Referring Practitioner: Dr. Margoth Escalante of care signed (Y/N): []  Yes [x]  No     Date of Patient follow up with Physician: ?     Progress Report: []  Yes  [x]  No     Date Range for reporting period:  Beginnin2020  Ending:     Progress report due (10 Rx/or 30 days whichever is less): visit #6 or what insurance approves    Recertification due (POC duration/ or 90 days whichever is less): visit #6     Visit # Insurance Allowable Auth required? Date Range    + ?  Med nec once auth provided [x]  Yes  []  No ???     Latex Allergy:  [x]NO      []YES  Preferred Language for Healthcare:   [x]English       []other:    Functional Scale:        Date assessed:  QDASH 16; 11% impaired                                                                2020    Pain level:  0/10     SUBJECTIVE:  See eval    OBJECTIVE: See eval      RESTRICTIONS/PRECAUTIONS: Raynaud's    Exercises/Interventions:     Therapeutic Exercises (72159) Resistance / level Sets/sec Reps Notes   Chin tucks in supine  Scap retract sitting   UT stretch  LS stretch   1  1  30 sec  30 sec x10  x10  x2 B  x2 B                                                            Therapeutic Activities (93103)                                          Neuromuscular Re-ed (36136)                                                 Manual Intervention ([id number]) [] UE / cervical: cervical, scapular, scapulothoracic and UE control with self care, reaching, carrying, lifting, house/yardwork, driving, computer work. NMR and Therapeutic Activities:    [] (34023 or 81169) Provided verbal/tactile cueing for activities related to improving balance, coordination, kinesthetic sense, posture, motor skill, proprioception and motor activation to allow for proper function of   [] LE: / Lumbar core, proximal hip and LE with self care and ADLs  [] UE / Cervical: cervical, postural, scapular, scapulothoracic and UE control with self care, carrying, lifting, driving, computer work.   [] (44162) Gait Re-education- Provided training and instruction to the patient for proper LE, core and proximal hip recruitment and positioning and eccentric body weight control with ambulation re-education including up and down stairs     Home Management Training / Self Care:  [] (32261) Provided self-care/home management training related to activities of daily living and compensatory training, and/or use of adaptive equipment for improvement with: ADLs and compensatory training, meal preparation, safety procedures and instruction in use of adaptive equipment, including bathing, grooming, dressing, personal hygiene, basic household cleaning and chores.      Home Exercise Program:    [x] (75200) Reviewed/Progressed HEP activities related to strengthening, flexibility, endurance, ROM of   [] LE / Lumbar: core, proximal hip and LE for functional self-care, mobility, lifting and ambulation/stair navigation   [x] UE / Cervical: cervical, postural, scapular, scapulothoracic and UE control with self care, reaching, carrying, lifting, house/yardwork, driving, computer work  [] (19207)Reviewed/Progressed HEP activities related to improving balance, coordination, kinesthetic sense, posture, motor skill, proprioception of [] LE: core, proximal hip and LE for self care, mobility, lifting, and ambulation/stair navigation    [] UE / Cervical: cervical, postural,  scapular, scapulothoracic and UE control with self care, reaching, carrying, lifting, house/yardwork, driving, computer work    Manual Treatments:  PROM / STM / Oscillations-Mobs:  G-I, II, III, IV (PA's, Inf., Post.)  [] (26847) Provided manual therapy to mobilize LE, proximal hip and/or LS spine soft tissue/joints for the purpose of modulating pain, promoting relaxation,  increasing ROM, reducing/eliminating soft tissue swelling/inflammation/restriction, improving soft tissue extensibility and allowing for proper ROM for normal function with   [] LE / lumbar: self care, mobility, lifting and ambulation. [] UE / Cervical: self care, reaching, carrying, lifting, house/yardwork, driving, computer work. Modalities:  [] (16408) Vasopneumatic compression: Utilized vasopneumatic compression to decrease edema / swelling for the purpose of improving mobility and quad tone / recruitment which will allow for increased overall function including but not limited to self-care, transfers, ambulation, and ascending / descending stairs. Modalities:      Charges:  Timed Code Treatment Minutes: 25   Total Treatment Minutes: 40     [x] EVAL - LOW (48424) x 1  [] EVAL - MOD (73114)  [] EVAL - HIGH (03092)  [] RE-EVAL (81459)  [x] CR(80272) x 2      [] Ionto  [] NMR (35388) x       [] Vaso  [] Manual (82424) x       [] Ultrasound  [] TA x        [] Mech Traction (99360)  [] Aquatic Therapy x     [] ES (un) (20216):   [] Home Management Training x  [] ES(attended) (60504)   [] Dry Needling 1-2 muscles (94354):  [] Dry Needling 3+ muscles (464057)  [] Group:      [] Other:     GOALS:  Short Term Goals: To be achieved in: 2 weeks  1. Independent in HEP and progression per patient tolerance, in order to prevent re-injury.    [] Progressing: [] Met: [] Not Met: [] Adjusted Prognosis: [] Good [] Fair  [] Poor    Patient Requires Follow-up: [x] Yes  [] No    Plan for next treatment session:   Posture re-ed, thor ext, manual to soft tissue    PLAN: See eval. PT 2x / week for 3 weeks. [] Continue per plan of care [] Alter current plan (see comments)  [x] Plan of care initiated [] Hold pending MD visit [] Discharge    Electronically signed by: Magui Dalal PT, DPT    Note: If patient does not return for scheduled/ recommended follow up visits, this note will serve as a discharge from care along with most recent update on progress.

## 2020-12-28 NOTE — PLAN OF CARE
[facility]  Outpatient Physical Therapy, [address] [facility], [address]  Phone: [phone]   Fax: [phone]                                                     Physical Therapy Certification    Dear Referring Practitioner: Dr. Ember Sam,    We had the pleasure of evaluating the following patient for physical therapy services at [address] [place] [address]. A summary of our findings can be found in the initial assessment below. This includes our plan of care. If you have any questions or concerns regarding these findings, please do not hesitate to contact me at the office phone number checked above.   Thank you for the referral.       Physician Signature:_______________________________Date:__________________  By signing above (or electronic signature), therapists plan is approved by physician      Patient: Deidra Chua   : 1950   MRN: [id number]  Referring Physician: Referring Practitioner: Dr. Ember Sam      Evaluation Date: 2020      Medical Diagnosis Information:  Diagnosis: M77.8 (ICD-10-CM) - Tendonitis of elbow, right   Treatment Diagnosis: decreased GHJ IR, impaired posture, increased tension in scapulothoraic mm                Insurance information: PT Insurance Information: [id number] (auth required through [employer], med [employer])    Precautions/ Contra-indications:  basal cell CA, Raynaud's   Latex Allergy:  [x]NO      []YES  Preferred Language for Healthcare:   [x]English       []Other:    C-SSRS Triggered by Intake questionnaire (Past 2 wk assessment ):   [x] No, Questionnaire did not trigger screening.   [] Yes, Patient intake triggered C-SSRS Screening      [] C-SSRS Screening completed  [] PCP notified via Epic SUBJECTIVE: Patient stated complaint: R elbow pain started about 1 year ago. Didn't seek help due to pandemic. Was on a trip to [facility] and thinks her pain started with moving all of her luggage. During the pandemic did clean out her house which may have irritated her elbow more. Used to work out at [employer], but now does workouts at home. Has seen MD a few times for this issue - did not take her ibuprofen. Bothers her the most when she is sleeping - feels stiff. Using it during the day doesn't really bother her.      Relevant Medical History: basal cell CA and Raynaud's    Functional Outcome: Quick DASH: raw score = 16; dysfunction = 11%    Pain Scale: 0/10  Easing factors: rest  Provocative factors: sleeping, reaching, some exercises    Type: []Constant   [x]Intermittent  []Radiating []Localized []other:     Numbness/Tingling:  none    Occupation/School:  Retried      Living Status/Prior Level of Function: Prior to this injury / incident, pt was independent with ADLs and IADLs, lives alone and takes care of her home and herself without issues       OBJECTIVE:     Hand dominance: R    Palpation: no TTP through R elbow, wrist or shoulder     Functional Mobility/Transfers:  Independent     Posture:  winging B lightly, forward head and shoulders      Bandages/Dressings/Incisions:  none      Dermatomes Normal Abnormal Comments   Top of head (C1)      Posterior occipital region (C2)      Side of neck (C3)      Top of shoulder (C4) x     Lateral deltoid (C5) x     Tip of thumb (C6) x     Distal middle finger (C7) x     Distal fifth finger (C8) x     Medial forearm (T1) x             Reflexes Normal Abnormal Comments   C5-6 Biceps x     C5-6 Brachioradialis      C7-8 Triceps      De Leons           PROM AROM    L R L R   Cervical Flexion        Cervical Extension        Cervical Rotation       Cervical Side-bend       Shoulder Flexion     160   Shoulder Abduction     180 []Osteoporosis (M81.8)  []Osteopenia (M85.8)   Endocrine conditions   []Hypothyroid (E03.9)  []Hyperthyroid Gastrointestinal conditions   []Constipation (O16.10)   Metabolic conditions   []Morbid obesity (E66.01)  []Diabetes type 1(E10.65) or 2 (E11.65)   []Neuropathy (G60.9)     Pulmonary conditions   []Asthma (J45)  []Coughing   []COPD (J44.9)   Psychological Disorders  []Anxiety (F41.9)  []Depression (F32.9)   []Other:   [x]Other:     Raynaud's      Barriers to/and or personal factors that will affect rehab potential:              [x]Age  []Sex    []Smoker              []Motivation/Lack of Motivation                        []Co-Morbidities              []Cognitive Function, education/learning barriers              []Environmental, home barriers              []profession/work barriers  [x]past PT/medical experience  []other:  Justification: motivated, active, exercises regularly      Falls Risk Assessment (30 days):   [x] Falls Risk assessed and no intervention required.   [] Falls Risk assessed and Patient requires intervention due to being higher risk   TUG score (>12s at risk):     [] Falls education provided, including       ASSESSMENT:   Functional Impairments   []Noted spinal or UE joint hypomobility   []Noted spinal or UE joint hypermobility   []Decreased UE functional ROM   []Decreased UE functional strength   []Abnormal reflexes/sensation/myotomal/dermatomal deficits   []Decreased RC/scapular/core strength and neuromuscular control   [x]other:  decreased GHJ IR, impaired posture, increased tension in scapulothoraic mm     Functional Activity Limitations (from functional questionnaire and intake)   [x]Reduced ability to tolerate prolonged functional positions, abby while sleeping    []Reduced ability or difficulty with changes of positions or transfers between positions   [x]Reduced ability to maintain good posture and demonstrate good body mechanics with sitting, bending, and lifting [] Reduced ability or tolerance with driving and/or computer work   [x]Reduced ability to sleep   []Reduced ability to perform lifting, reaching, carrying tasks   []Reduced ability to tolerate impact through UE   []Reduced ability to reach behind back   []Reduced ability to  or hold objects   []Reduced ability to throw or toss an object   []other:    Participation Restrictions   []Reduced participation in self care activities   []Reduced participation in home management activities   []Reduced participation in work activities   []Reduced participation in social activities. []Reduced participation in sport/recreation activities. Classification:   []Signs/symptoms consistent with post-surgical status including decreased ROM, strength and function.   []Signs/symptoms consistent with joint sprain/strain   []Signs/symptoms consistent with shoulder impingement   []Signs/symptoms consistent with shoulder/elbow/wrist tendinopathy   []Signs/symptoms consistent with Rotator cuff tear   []Signs/symptoms consistent with labral tear   [x]Signs/symptoms consistent with postural dysfunction    []Signs/symptoms consistent with Glenohumeral IR Deficit - <45 degrees   []Signs/symptoms consistent with facet dysfunction of cervical/thoracic spine    []Signs/symptoms consistent with pathology which may benefit from Dry needling     []other:     Prognosis/Rehab Potential:      []Excellent   [x]Good    []Fair   []Poor    Tolerance of evaluation/treatment:    []Excellent   [x]Good    []Fair   []Poor    Physical Therapy Evaluation Complexity Justification  [x] A history of present problem with:  [] no personal factors and/or comorbidities that impact the plan of care;  [x]1-2 personal factors and/or comorbidities that impact the plan of care  []3 personal factors and/or comorbidities that impact the plan of care [x] An examination of body systems using standardized tests and measures addressing any of the following: body structures and functions (impairments), activity limitations, and/or participation restrictions;:  [] a total of 1-2 or more elements   [x] a total of 3 or more elements   [] a total of 4 or more elements   [x] A clinical presentation with:  [x] stable and/or uncomplicated characteristics   [] evolving clinical presentation with changing characteristics  [] unstable and unpredictable characteristics;   [x] Clinical decision making of [] low, [] moderate, [] high complexity using standardized patient assessment instrument and/or measurable assessment of functional outcome. [x] EVAL (LOW) 58574 (typically 15 minutes face-to-face)  [] EVAL (MOD) 67970 (typically 30 minutes face-to-face)  [] EVAL (HIGH) 99173 (typically 45 minutes face-to-face)  [] RE-EVAL     PLAN:  Frequency/Duration:  2 days per week for 3 Weeks:  INTERVENTIONS:  [x] Therapeutic exercise including: strength training, ROM, for Upper extremity and core   [x]  NMR activation and proprioception for UE, scap and Core   [x] Manual therapy as indicated for shoulder, scapula and spine to include: Dry Needling/IASTM, STM, PROM, Gr I-IV mobilizations, manipulation. [x] Modalities as needed that may include: thermal agents, E-stim, Biofeedback, US, iontophoresis as indicated  [x] Patient education on joint protection, postural re-education, activity modification, progression of HEP. HEP instruction: Written HEP instructions provided and reviewed    GOALS:  Patient stated goal: no waking up at night with discomfort in her arm   [] Progressing: [] Met: [] Not Met: [] Adjusted    Therapist goals for Patient:   Short Term Goals: To be achieved in: 2 weeks  1. Independent in HEP and progression per patient tolerance, in order to prevent re-injury.    [] Progressing: [] Met: [] Not Met: [] Adjusted 2. Patient will have a decrease in pain to facilitate improvement in movement, function, and ADLs as indicated by Functional Deficits. [] Progressing: [] Met: [] Not Met: [] Adjusted    Long Term Goals: To be achieved in: 3 weeks  1. Disability index score of 0% or less for the Quick DASH to assist with reaching prior level of function. [] Progressing: [] Met: [] Not Met: [] Adjusted  2. Patient will demonstrate correct erect posture in sitting, standing, and sidelying to allow for proper joint functioning as indicated by patients Functional Deficits. [] Progressing: [] Met: [] Not Met: [] Adjusted  3. Patient will return to functional activities including sleeping without increased symptoms or restriction. [] Progressing: [] Met: [] Not Met: [] Adjusted  4. Pt will improve R GHJ AROM into IR by 15 deg or greater to allow pt to lie on her side without symtpoms.   [] Progressing: [] Met: [] Not Met: [] Adjusted     Electronically signed by:  Thelma Ruiz, PT, DPT

## 2021-02-23 ENCOUNTER — TELEPHONE (OUTPATIENT)
Dept: FAMILY MEDICINE CLINIC | Age: 71
End: 2021-02-23

## 2021-02-23 DIAGNOSIS — M77.8 TENDONITIS OF ELBOW, RIGHT: Primary | ICD-10-CM

## 2021-02-23 NOTE — TELEPHONE ENCOUNTER
Patient is calling to get another order for PT she had one back in 2020 but it has  according to Physical Therapy. Patient would like to go to PT at [facility].  Please call patient [phone]

## 2021-03-25 ENCOUNTER — HOSPITAL ENCOUNTER (OUTPATIENT)
Dept: PHYSICAL THERAPY | Age: 71
Setting detail: THERAPIES SERIES
Discharge: HOME | End: 2021-03-25
Payer: MEDICARE

## 2021-03-25 PROCEDURE — 97161 PT EVAL LOW COMPLEX 20 MIN: CPT

## 2021-03-25 PROCEDURE — 97110 THERAPEUTIC EXERCISES: CPT

## 2021-03-25 NOTE — FLOWSHEET NOTE
Manual Intervention (72697)       Shld /GH Mobs       Post Cap mobs       Thoracic/Rib manipualtion       CT MT/Mobs       PROM MT                  Modalities:   POSSIBLY ULTRASOUND    Pt. Education:  3/25: pt educated on diagnosis, prognosis and expectations for rehab, all pt questions were answered, reviewed current exercise program and made correction to sitting chin tuck     Home Exercise Program:  Access Code: [email or web address]  URL: [employer]/  Date: 03/25/2021  Prepared by: Bisi Furlong    Exercises  Seated Scapular Retraction - 1 x daily - 7 x weekly - 10 reps - 3 sets  Wall Push Up - 1 x daily - 7 x weekly - 10 reps - 3 sets  Seated Levator Scapulae Stretch - 1 x daily - 7 x weekly - 10 reps - 3 sets  Seated Cervical Sidebending Stretch - 1 x daily - 7 x weekly - 10 reps - 3 sets      Therapeutic Exercise and NMR EXR  [x] (24049) Provided verbal/tactile cueing for activities related to strengthening, flexibility, endurance, ROM  for improvements in scapular, scapulothoracic and UE control with self care, reaching, carrying, lifting, house/yardwork, driving/computer work.    [] (25924) Provided verbal/tactile cueing for activities related to improving balance, coordination, kinesthetic sense, posture, motor skill, proprioception  to assist with  scapular, scapulothoracic and UE control with self care, reaching, carrying, lifting, house/yardwork, driving/computer work.  [] (50262) Therapist is in constant attendance of 2 or more patients providing skilled therapy interventions, but not providing any significant amount of measurable one-on-one time to either patient, for improvements in cervical, scapular, scapulothoracic and UE control with self care, reaching, carrying, lifting, house/yardwork, driving, computer work.      Therapeutic Activities:    [] (55761 or 70708) Provided verbal/tactile cueing for activities related to improving balance, coordination, kinesthetic sense, posture, motor skill, proprioception and motor activation to allow for proper function of scapular, scapulothoracic and UE control with self care, carrying, lifting, driving/computer work. Home Exercise Program:    [x] (74535) Reviewed/Progressed HEP activities related to strengthening, flexibility, endurance, ROM of scapular, scapulothoracic and UE control with self care, reaching, carrying, lifting, house/yardwork, driving/computer work  [] (23507) Reviewed/Progressed HEP activities related to improving balance, coordination, kinesthetic sense, posture, motor skill, proprioception of scapular, scapulothoracic and UE control with self care, reaching, carrying, lifting, house/yardwork, driving/computer work      Manual Treatments:  PROM / STM / Oscillations-Mobs:  G-I, II, III, IV (PA's, Inf., Post.)  [] (92834) Provided manual therapy to mobilize soft tissue/joints of cervical/CT, scapular GHJ and UE for the purpose of modulating pain, promoting relaxation,  increasing ROM, reducing/eliminating soft tissue swelling/inflammation/restriction, improving soft tissue extensibility and allowing for proper ROM for normal function with self care, reaching, carrying, lifting, house/yardwork, driving/computer work      Charges:  Timed Code Treatment Minutes: 30   Total Treatment Minutes: 45       [x] EVAL - LOW (89285) x 1  [] EVAL - MOD (75249)  [] EVAL - HIGH (25534)  [] RE-EVAL (11406)  [x] NB(21014) x 2     [] Ionto  [] NMR (04737) x      [] Vaso  [] Manual (77043) x      [] Ultrasound:  [] TA x       [] Mech Traction (88961)  [] Home Management Training x    [] ES (un) (03230):   [] Aquatic    [] ES(attended) (33603)  [] Other:                     GOALS:     Short Term Goals: To be achieved in: 2 weeks  1. Independent in HEP and progression per patient tolerance, in order to prevent re-injury. [] Progressing: [] Met: [] Not Met: [] Adjusted  2.  Patient will have a decrease in pain to facilitate improvement in movement, function, and ADLs as indicated by Functional Deficits. [] Progressing: [] Met: [] Not Met: [] Adjusted    Long Term Goals: To be achieved in: 6 weeks  1. Disability index score of 0% or less on the Quick DASH to assist with reaching prior level of function. [] Progressing: [] Met: [] Not Met: [] Adjusted  2. Patient will demonstrate an increase in Strength in R UE to 5/5 to allow for proper functional mobility as indicated by patients Functional Deficits. [] Progressing: [] Met: [] Not Met: [] Adjusted  3. Patient will return to functional activities including working out with heavy weights without increased symptoms or restriction. [] Progressing: [] Met: [] Not Met: [] Adjusted  4. Pt will be able to wake up from a full night's sleep without stiffness in her elbow to demo return to PLOF. Overall Progression Towards Functional goals/ Treatment Progress Update:  [] Patient is progressing as expected towards functional goals listed. [] Progression is slowed due to complexities/Impairments listed. [] Progression has been slowed due to co-morbidities.   [x] Plan just implemented, too soon to assess goals progression <30days   [] Goals require adjustment due to lack of progress  [] Patient is not progressing as expected and requires additional follow up with physician  [] Other    Persisting Functional Limitations/Impairments:  []Sitting []Standing   []Transfers  []Sleeping   []Reaching []Lifting   []ADLs []Housework  []Driving []Job related tasks  []Sports/Recreation []Other:    ASSESSMENT:  See eval      Treatment/Activity Tolerance:  [x] Pt able to complete treatment [] Patient limited by fatique  [] Patient limited by pain  [] Patient limited by other medical complications  [] Other:     Prognosis:  [x] Good [] Fair  [] Poor    Patient Requires Follow-up: [x] Yes  [] No    Return to Play:    [x]  N/A     []  Stage 1: Intro to Strength   []  Stage 2: Dynamic Strength and Intro to Plyometrics   []  Stage 3: Advanced Plyometrics and Intro to Throwing   []  Stage 4: Sport specific Training/Return to Sport     []  Ready to Return to Play, [employer] St[employer]   []  Not Ready for Return to Sports   Comments:      PLAN: See eval. PT 1x / week for 6 weeks. [] Continue per plan of care [] Alter current plan (see comments)  [x] Plan of care initiated [] Hold pending MD visit [] Discharge    Electronically signed by: Ronaldo Chavez PT, DPT      Note: If patient does not return for scheduled/ recommended follow up visits, this note will serve as a discharge from care along with most recent update on progress.

## 2021-03-25 NOTE — PLAN OF CARE
[facility]  Outpatient Physical Therapy, [facility], [address]  Phone: [phone]   Fax: [phone]                                                     Physical Therapy Certification    Dear Referring Practitioner: Dr. Francisca Mccrary,    We had the pleasure of evaluating the following patient for physical therapy services at [address] [place] [address]. A summary of our findings can be found in the initial assessment below. This includes our plan of care. If you have any questions or concerns regarding these findings, please do not hesitate to contact me at the office phone number checked above. Thank you for the referral.       Physician Signature:_______________________________Date:__________________  By signing above (or electronic signature), therapists plan is approved by physician      Patient: Prieto Bauman   : 1950   MRN: [id number]  Referring Physician: Referring Practitioner: Dr. Francisca Mccrary      Evaluation Date: 3/25/2021      Medical Diagnosis Information:  Diagnosis: R elbow tendinitis   Treatment Diagnosis: slightly decreased strangth in R UE, increased tone in R forearm, low endurance with resistance program                                         Insurance information: PT Insurance Information: Paola Liner (auth through [employer], $40 copay)    Precautions/ Contra-indications: none  Latex Allergy:  [x]NO      []YES  Preferred Language for Healthcare:   [x]English       []Other:    C-SSRS Triggered by Intake questionnaire (Past 2 wk assessment ):   [x] No, Questionnaire did not trigger screening.   [] Yes, Patient intake triggered C-SSRS Screening     [] Completed, no further action required. [] Completed, PCP notified via Epic    SUBJECTIVE: Patient stated complaint: is back for her R elbow. Was at this clinic in 2020 for eval only. Pt states she did have time to come in with the window insurance gave her for visits.  Plans to complete therapy this time around. Pt was on a trip in [place] and had to move her luggage around quite a bit. When she got back from the trip she had numbness in her R forearm. Also had a sharp pain. Pulling on her arm is also painful. Wasn't able to do her exercise classes that required full elbow extension. She is doing better now and can fully extend her elbow. Currently she has pain if she falls asleep with her elbow bent. Also gets irritated while brushing her teeth with her elbow bent. Is able to use 8# weights udring her video exercises classes - sometimes has to switch to 3-5# if her elbow starts to hurt.      Relevant Medical History: hand sx in 2013    Functional Outcome: Quick DASH: raw score = 18; dysfunction = 16%    Pain Scale: 0/10  Easing factors: keeping elbow in neutral   Provocative factors: using weights during exercise     Type: []Constant   [x]Intermittent  []Radiating []Localized []other:     Numbness/Tingling: some numbness in R forearm    Occupation/School: retired     Living Status/Prior Level of Function: Prior to this injury / incident, pt was independent with ADLs and IADLs, lives alone and takes care of her home and herself without issues       OBJECTIVE:     Hand dominance: R    Palpation: no TTP -- some irritation in R forearm mm     Functional Mobility/Transfers: independent     Posture: slightly forward head     Bandages/Dressings/Incisions:  none      Dermatomes Normal Abnormal Comments   Top of head (C1)      Posterior occipital region (C2)      Side of neck (C3)      Top of shoulder (C4) x     Lateral deltoid (C5) x     Tip of thumb (C6) x     Distal middle finger (C7) x     Distal fifth finger (C8) x     Medial forearm (T1) x             Reflexes Normal Abnormal Comments   C5-6 Biceps x     C5-6 Brachioradialis      C7-8 Triceps      De Leons           PROM AROM    L R L R   Cervical Flexion        Cervical Extension        Cervical Rotation       Cervical Side-bend       Shoulder Flexion        Shoulder Abduction        Shoulder External Rotation        Shoulder Internal Rotation        Elbow Flexion    150 deg 145 deg   Elbow Extension    hyperext 5 deg hyperext 3 deg   Pronation     70 deg   Supination     85 deg   Wrist Flexion     75 deg   Wrist Extension     90 deg   Radial Deviation        Ulnar Deviation            Strength (0-5) Left Right    Shoulder Shrug (C4)     Shoulder Flex     Shoulder Abd (C5)     Shoulder ER     Shoulder IR     Biceps (C6)  4+   Triceps (C7)  5   Lats     Middle Trap     Rhomboids     Lower Trap      Pronation   4+   Supination   5   Wrist Flex (C7)  5   Wrist Ext (C6)  5   Wrist Radial Deviation  5   Wrist Ulnar Deviation  5                  Joint mobility:    [x]Normal    []Hypo   []Hyper      Orthopaedic Special Tests Positive  Negative  NT Comments    Shoulder        Neer        Jimbo-[place]       Empty Can       Drop Arm       Loudon's       Speeds        Sulcus        Apprehension (Anterior / Posterior)       Load and Shift              Elbow        Cozen's       Varus Stress  x     Valgus Stress   x     Tinel's   x                                                [x] Patient history, allergies, meds reviewed. Medical chart reviewed. See intake form. Review Of Systems (ROS):  [x]Performed Review of systems (Integumentary, CardioPulmonary, Neurological) by intake and observation. Intake form has been scanned into medical record. Patient has been instructed to contact their primary care physician regarding ROS issues if not already being addressed at this time.       Co-morbidities/Complexities (which will affect course of rehabilitation):   []None        []Hx of COVID   Arthritic conditions   []Rheumatoid arthritis (M05.9)  []Osteoarthritis (M19.91)  []Gout   Cardiovascular conditions   []Hypertension (I10)  []Hyperlipidemia (E78.5)  []Angina pectoris (I20)  []Atherosclerosis (I70)  []Pacemaker  []Hx of CABG/stent/ cardiac surgeries   Musculoskeletal conditions   []Disc pathology   []Congenital spine pathologies   []Osteoporosis (M81.8)  []Osteopenia (M85.8)  []Scoliosis       Endocrine conditions   []Hypothyroid (E03.9)  []Hyperthyroid Gastrointestinal conditions   []Constipation (W90.04)   Metabolic conditions   []Morbid obesity (E66.01)  []Diabetes type 1(E10.65) or 2 (E11.65)   []Neuropathy (G60.9)     Cardio/Pulmonary conditions   []Asthma (J45)  []Coughing   []COPD (J44.9)  []CHF  []A-fib   Psychological Disorders  []Anxiety (F41.9)  []Depression (F32.9)   []Other:   Developmental Disorders  []Autism (F84.0)  []CP (G80)  []Down Syndrome (Q90.9)  []Developmental delay     Neurological conditions  []Prior Stroke (I69.30)  []Parkinson's (G20)  []Encephalopathy (G93.40)  []MS (G35)  []Post-polio (G14)  []SCI  []TBI  []ALS Other conditions  []Fibromyalgia (M79.7)  []Vertigo  []Syncope  []Kidney Failure  []Cancer      []currently undergoing                treatment  []Pregnancy  []Incontinence   Prior surgeries  []involved limb  []previous spinal surgery  [] section birth  []hysterectomy  []bowel / bladder surgery  []other relevant surgeries   [x]Other:     Raynaud's        Barriers to/and or personal factors that will affect rehab potential:              [x]Age  [x]Sex    []Smoker              []Motivation/Lack of Motivation                        []Co-Morbidities              []Cognitive Function, education/learning barriers              []Environmental, home barriers              []profession/work barriers  [x]past PT/medical experience  []other:  Justification: pt works out regularly and lives active lifestyle     Falls Risk Assessment (30 days):   [x] Falls Risk assessed and no intervention required.   [] Falls Risk assessed and Patient requires intervention due to being higher risk   TUG score (>12s at risk):     [] Falls education provided, including       ASSESSMENT:   Pt is a 68 y/o female who presents with R elbow pain. Pain has gotten better over the last few months since original injury in . Pt was seen for one visit in 2020, but let her approved visits  thinking the pain would get better on its own. Pt currently demos slightly decreased strangth in R UE, increased tone in R forearm, low endurance with resistance program. Pt would benefit from skilled therapy to address deficits and return to heavily weighted exercise program and sleeping without pain or limitations. Functional Impairments   []Noted spinal or UE joint hypomobility   []Noted spinal or UE joint hypermobility   [x]Decreased UE functional ROM   []Decreased UE functional strength   []Abnormal reflexes/sensation/myotomal/dermatomal deficits   []Decreased RC/scapular/core strength and neuromuscular control   []other:      Functional Activity Limitations (from functional questionnaire and intake)   []Reduced ability to tolerate prolonged functional positions   []Reduced ability or difficulty with changes of positions or transfers between positions   []Reduced ability to maintain good posture and demonstrate good body mechanics with sitting, bending, and lifting   [] Reduced ability or tolerance with driving and/or computer work   [x]Reduced ability to sleep   []Reduced ability to perform lifting, reaching, carrying tasks   [x]Reduced ability to tolerate impact through UE   []Reduced ability to reach behind back   []Reduced ability to  or hold objects   []Reduced ability to throw or toss an object   []other:    Participation Restrictions   []Reduced participation in self care activities   []Reduced participation in home management activities   []Reduced participation in work activities   []Reduced participation in social activities. [x]Reduced participation in sport/recreation activities. Classification:   []Signs/symptoms consistent with post-surgical status including decreased ROM, strength and function.   []Signs/symptoms consistent with joint sprain/strain   []Signs/symptoms consistent with shoulder impingement   [x]Signs/symptoms consistent with shoulder/elbow/wrist tendinopathy   []Signs/symptoms consistent with Rotator cuff tear   []Signs/symptoms consistent with labral tear   []Signs/symptoms consistent with postural dysfunction    []Signs/symptoms consistent with Glenohumeral IR Deficit - <45 degrees   []Signs/symptoms consistent with facet dysfunction of cervical/thoracic spine    []Signs/symptoms consistent with pathology which may benefit from Dry needling     []other:     Prognosis/Rehab Potential:      []Excellent   [x]Good    []Fair   []Poor    Tolerance of evaluation/treatment:    []Excellent   [x]Good    []Fair   []Poor    Physical Therapy Evaluation Complexity Justification  [x] A history of present problem with:  [] no personal factors and/or comorbidities that impact the plan of care;  [x]1-2 personal factors and/or comorbidities that impact the plan of care  []3 personal factors and/or comorbidities that impact the plan of care  [x] An examination of body systems using standardized tests and measures addressing any of the following: body structures and functions (impairments), activity limitations, and/or participation restrictions;:  [x] a total of 1-2 or more elements   [] a total of 3 or more elements   [] a total of 4 or more elements   [x] A clinical presentation with:  [x] stable and/or uncomplicated characteristics   [] evolving clinical presentation with changing characteristics  [] unstable and unpredictable characteristics;   [x] Clinical decision making of [x] low, [] moderate, [] high complexity using standardized patient assessment instrument and/or measurable assessment of functional outcome.     [x] EVAL (LOW) 15021 (typically 15 minutes face-to-face)  [] EVAL (MOD) 07888 (typically 30 minutes face-to-face)  [] EVAL (HIGH) 77014 (typically 45 minutes face-to-face)  [] RE-EVAL     PLAN: [] Met: [] Not Met: [] Adjusted     Electronically signed by:  Caity Vines, PT, DPT

## 2021-03-30 ENCOUNTER — HOSPITAL ENCOUNTER (OUTPATIENT)
Dept: PHYSICAL THERAPY | Age: 71
Setting detail: THERAPIES SERIES
Discharge: HOME | End: 2021-03-30
Payer: MEDICARE

## 2021-03-30 PROCEDURE — 97530 THERAPEUTIC ACTIVITIES: CPT

## 2021-03-30 PROCEDURE — 97110 THERAPEUTIC EXERCISES: CPT

## 2021-03-30 PROCEDURE — 97140 MANUAL THERAPY 1/> REGIONS: CPT

## 2021-03-30 NOTE — FLOWSHEET NOTE
[facility]  Phone: [phone]   Fax: [phone]    Physical Therapy Treatment Note/ Progress Report:     Date:  3/30/2021    Patient Name:  Bandar Contreras    :  1950  MRN: [id number]  Restrictions/Precautions:    Medical/Treatment Diagnosis Information:  Diagnosis: R elbow tendinitis  Treatment Diagnosis: slightly decreased strangth in R UE, increased tone in R forearm, low endurance with resistance program  Insurance/Certification information:  PT Insurance Information: Angel Hoang (auth through [employer], $40 copay)  Physician Information:  Referring Practitioner: Dr. Macey Trevion of care signed (Y/N): []  Yes [x]  No     Date of Patient follow up with Physician: ?     Progress Report: []  Yes  [x]  No     Date Range for reporting period:  Beginning: 3/25/21  Ending:     Progress report due (10 Rx/or 30 days whichever is less): visit #6 or      Recertification due (POC duration/ or 90 days whichever is less): visit #6 or 21     Visit # Insurance Allowable Auth required? Date Range    + 1/  [x]  Yes  []  No ?     Latex Allergy:  [x]NO      []YES  Preferred Language for Healthcare:   [x]English       []other:    Functional Scale:       Date assessed:  Quick DASH: raw score = 18; dysfunction = 16%              3/25/21    Pain level:  1/10     SUBJECTIVE:  Pt reports nothing new since eval. Pain level remains low. Surprised how [employer] approval came back.     OBJECTIVE: See eval   Observation:    Test measurements:      RESTRICTIONS/PRECAUTIONS:  Raynaud's    Exercises/Interventions:   Therapeutic Exercise (56010) Resistance / level Sets / Seconds  Reps Notes/Cues   Chin tuck in sitting and supine      Scap retract      UT stretch   LS stretch      Wall push up     UBE Forward/retro 2 min/2 min  Level 2           Pulleys abd    1 min     High level strength                            Therapeutic Activities (54454)       Wringing wood and blue bar at 90 deg flexion   X 2 min     Supination/pronation with wood and blue bar   X 2 min R    LPD  Mid row  ER  IR 4 pl  4 pl  2 pl  2 pl 2  2  1  1 10  10  10  10                  Neuromuscular Re-ed (72520)       High level weight bearing and proprioception                                   Manual Intervention (18562)       hawkgrips #9 and #5 to extensor mass     X 9 min     Post Cap mobs       Thoracic/Rib manipualtion       CT MT/Mobs       PROM MT                  Modalities:   POSSIBLY ULTRASOUND    Pt. Education:  3/25: pt educated on diagnosis, prognosis and expectations for rehab, all pt questions were answered, reviewed current exercise program and made correction to sitting chin tuck     Home Exercise Program:  Access Code: [email or web address]  URL: [email or web address]  Date: 03/25/2021  Prepared by: Bisi Rollins    Exercises  Seated Scapular Retraction - 1 x daily - 7 x weekly - 10 reps - 3 sets  Wall Push Up - 1 x daily - 7 x weekly - 10 reps - 3 sets  Seated Levator Scapulae Stretch - 1 x daily - 7 x weekly - 10 reps - 3 sets  Seated Cervical Sidebending Stretch - 1 x daily - 7 x weekly - 10 reps - 3 sets      Therapeutic Exercise and NMR EXR  [x] (92340) Provided verbal/tactile cueing for activities related to strengthening, flexibility, endurance, ROM  for improvements in scapular, scapulothoracic and UE control with self care, reaching, carrying, lifting, house/yardwork, driving/computer work.    [] (27207) Provided verbal/tactile cueing for activities related to improving balance, coordination, kinesthetic sense, posture, motor skill, proprioception  to assist with  scapular, scapulothoracic and UE control with self care, reaching, carrying, lifting, house/yardwork, driving/computer work.  [] (55493) Therapist is in constant attendance of 2 or more patients providing skilled therapy interventions, but not providing any significant amount of measurable one-on-one time to either patient, for improvements in cervical, scapular, scapulothoracic and UE control with self care, reaching, carrying, lifting, house/yardwork, driving, computer work. Therapeutic Activities:    [x] (28690 or 95228) Provided verbal/tactile cueing for activities related to improving balance, coordination, kinesthetic sense, posture, motor skill, proprioception and motor activation to allow for proper function of scapular, scapulothoracic and UE control with self care, carrying, lifting, driving/computer work.      Home Exercise Program:    [] (27903) Reviewed/Progressed HEP activities related to strengthening, flexibility, endurance, ROM of scapular, scapulothoracic and UE control with self care, reaching, carrying, lifting, house/yardwork, driving/computer work  [] (27353) Reviewed/Progressed HEP activities related to improving balance, coordination, kinesthetic sense, posture, motor skill, proprioception of scapular, scapulothoracic and UE control with self care, reaching, carrying, lifting, house/yardwork, driving/computer work      Manual Treatments:  PROM / STM / Oscillations-Mobs:  G-I, II, III, IV (PA's, Inf., Post.)  [x] (58215) Provided manual therapy to mobilize soft tissue/joints of cervical/CT, scapular GHJ and UE for the purpose of modulating pain, promoting relaxation,  increasing ROM, reducing/eliminating soft tissue swelling/inflammation/restriction, improving soft tissue extensibility and allowing for proper ROM for normal function with self care, reaching, carrying, lifting, house/yardwork, driving/computer work      Charges:  Timed Code Treatment Minutes: 40   Total Treatment Minutes: 40       [] EVAL - LOW (07528)   [] EVAL - MOD (33144)  [] EVAL - HIGH (96056)  [] RE-EVAL (20780)  [x] EY(87082) x 1     [] Ionto  [] NMR (77248) x      [] Vaso  [x] Manual (20776) x 1     [] Ultrasound:  [x] TA x 1      [] Mech Traction (95421)  [] Home Management Training x    [] ES (un) (50450):   [] Aquatic    [] ES(attended) (60183)  [] Other:                     GOALS:     Short Term Goals: To be achieved in: 2 weeks  1. Independent in HEP and progression per patient tolerance, in order to prevent re-injury. [] Progressing: [] Met: [] Not Met: [] Adjusted  2. Patient will have a decrease in pain to facilitate improvement in movement, function, and ADLs as indicated by Functional Deficits. [] Progressing: [] Met: [] Not Met: [] Adjusted    Long Term Goals: To be achieved in: 6 weeks  1. Disability index score of 0% or less on the Quick DASH to assist with reaching prior level of function. [] Progressing: [] Met: [] Not Met: [] Adjusted  2. Patient will demonstrate an increase in Strength in R UE to 5/5 to allow for proper functional mobility as indicated by patients Functional Deficits. [] Progressing: [] Met: [] Not Met: [] Adjusted  3. Patient will return to functional activities including working out with heavy weights without increased symptoms or restriction. [] Progressing: [] Met: [] Not Met: [] Adjusted  4. Pt will be able to wake up from a full night's sleep without stiffness in her elbow to demo return to PLOF. Overall Progression Towards Functional goals/ Treatment Progress Update:  [] Patient is progressing as expected towards functional goals listed. [] Progression is slowed due to complexities/Impairments listed. [] Progression has been slowed due to co-morbidities. [x] Plan just implemented, too soon to assess goals progression <30days   [] Goals require adjustment due to lack of progress  [] Patient is not progressing as expected and requires additional follow up with physician  [] Other    Persisting Functional Limitations/Impairments:  []Sitting []Standing   []Transfers  []Sleeping   []Reaching []Lifting   []ADLs []Housework  []Driving []Job related tasks  []Sports/Recreation []Other:    ASSESSMENT:    Pt is inconsistent with pain complaints - only can reproduce with GHJ abd and elbow flexion.  Did report some increased movement post hawkgrips. Plan to continue strengthening/resuistance training, soft tissue mobility, and weight bearing. Treatment/Activity Tolerance:  [x] Pt able to complete treatment [] Patient limited by fatique  [] Patient limited by pain  [] Patient limited by other medical complications  [] Other:     Prognosis:  [x] Good [] Fair  [] Poor    Patient Requires Follow-up: [x] Yes  [] No    Return to Play:    [x]  N/A     []  Stage 1: Intro to Strength   []  Stage 2: Dynamic Strength and Intro to Plyometrics   []  Stage 3: Advanced Plyometrics and Intro to Throwing   []  Stage 4: Sport specific Training/Return to Sport     []  Ready to Return to Play, [employer]   []  Not Ready for Return to Sports   Comments:      PLAN: See eval. PT 1x / week for 6 weeks. [x] Continue per plan of care [] Alter current plan (see comments)  [] Plan of care initiated [] Hold pending MD visit [] Discharge    Electronically signed by: Stephanie Xiong PT, DPT      Note: If patient does not return for scheduled/ recommended follow up visits, this note will serve as a discharge from care along with most recent update on progress.

## 2021-04-02 ENCOUNTER — TELEPHONE (OUTPATIENT)
Dept: AUDIOLOGY | Age: 71
End: 2021-04-02

## 2021-04-02 ENCOUNTER — OFFICE VISIT (OUTPATIENT)
Dept: ENT CLINIC | Age: 71
End: 2021-04-02
Payer: MEDICARE

## 2021-04-02 VITALS
HEART RATE: 78 BPM | TEMPERATURE: 97 F | RESPIRATION RATE: 14 BRPM | HEIGHT: 65 IN | DIASTOLIC BLOOD PRESSURE: 65 MMHG | SYSTOLIC BLOOD PRESSURE: 138 MMHG | BODY MASS INDEX: 18.99 KG/M2 | WEIGHT: 114 LBS

## 2021-04-02 DIAGNOSIS — H61.23 EXCESSIVE CERUMEN IN BOTH EAR CANALS: Primary | ICD-10-CM

## 2021-04-02 DIAGNOSIS — H60.8X3 CHRONIC ECZEMATOUS OTITIS EXTERNA OF BOTH EARS: ICD-10-CM

## 2021-04-02 PROCEDURE — 69210 REMOVE IMPACTED EAR WAX UNI: CPT | Performed by: OTOLARYNGOLOGY

## 2021-04-02 RX ORDER — TRIAMCINOLONE ACETONIDE 1 MG/G
CREAM TOPICAL
Qty: 15 G | Refills: 0 | Status: SHIPPED | OUTPATIENT
Start: 2021-04-02

## 2021-04-02 NOTE — TELEPHONE ENCOUNTER
Patient calling for [facility] for hearing aids. She wanted to pick some up since she has an appointment today with Dr. Jennie Villalobos. She said she will reach out to Dr. Dolly Yu next week.

## 2021-04-02 NOTE — PROGRESS NOTES
Patient has been having problems relative to cerumen accumulation in the past.  She also has some itching in both of her ears on a chronic basis as well. She has had no particular change in hearing and no change in tinnitus. She has had no vertigo. She does wear hearing aids. No fevers been present. Currently, she is in no acute distress. Ear examination does reveal rather extensive cerumen accumulation in both ear canals removed with a combination of irrigation, suctioning, curettage, and cleaning with peroxide. Upon removal, the tympanic membranes appear normal and the ear canals are now clear. There is fair amount of dryness to the skin and this may be part of the reason for accumulation of cerumen as well as the itching. Oral examination is unremarkable. The nasal mucosa is normal as well. The neck is free of adenopathy, mass, thyroid enlargement. There is no nystagmus present. Besides removal of the cerumen, I have suggested the use of triamcinolone cream to be used on a nightly basis to see if this may prevent further accumulation and also assist in prevention of itching. She will contact me in 2 to 4 weeks to determine her response.

## 2021-04-06 ENCOUNTER — HOSPITAL ENCOUNTER (OUTPATIENT)
Dept: PHYSICAL THERAPY | Age: 71
Setting detail: THERAPIES SERIES
Discharge: HOME | End: 2021-04-06
Payer: MEDICARE

## 2021-04-06 PROCEDURE — 97140 MANUAL THERAPY 1/> REGIONS: CPT

## 2021-04-06 PROCEDURE — 97110 THERAPEUTIC EXERCISES: CPT

## 2021-04-06 PROCEDURE — 97530 THERAPEUTIC ACTIVITIES: CPT

## 2021-04-06 NOTE — FLOWSHEET NOTE
[address] [facility]  Phone: [phone]   Fax: [phone]    Physical Therapy Treatment Note/ Progress Report:     Date:  2021    Patient Name:  Yovany Marr    :  1950  MRN: [id number]  Restrictions/Precautions:    Medical/Treatment Diagnosis Information:  Diagnosis: R elbow tendinitis  Treatment Diagnosis: slightly decreased strangth in R UE, increased tone in R forearm, low endurance with resistance program  Insurance/Certification information:  PT Insurance Information: Ziyad Pouch (auth through [employer], $40 copay)  Physician Information:  Referring Practitioner: Dr. Alex Yang of care signed (Y/N): []  Yes [x]  No     Date of Patient follow up with Physician: ?     Progress Report: []  Yes  [x]  No     Date Range for reporting period:  Beginning: 3/25/21  Ending:     Progress report due (10 Rx/or 30 days whichever is less): visit #6 or      Recertification due (POC duration/ or 90 days whichever is less): visit #6 or 21     Visit # Insurance Allowable Auth required? Date Range    + 2/ 4  TE, TA, man, neuro, US  [x]  Yes  []  No 3/25//21-21     Latex Allergy:  [x]NO      []YES  Preferred Language for Healthcare:   [x]English       []other:    Functional Scale:       Date assessed:  Quick DASH: raw score = 18; dysfunction = 16%              3/25/21    Pain level:  1/10     SUBJECTIVE:  Pt reports nothing new since eval. Pain level remains low. Surprised how [employer] approval came back.     OBJECTIVE: See eval   Observation:    Test measurements:      RESTRICTIONS/PRECAUTIONS:  Raynaud's    Exercises/Interventions:   Therapeutic Exercise (46279) Resistance / level Sets / Seconds  Reps Notes/Cues   Chin tuck in sitting and supine      Scap retract      UT stretch   LS stretch      Wall push up     UBE Forward/retro 2.5 min/2.5 min  Level 2           Pulleys abd        High level strength- [facility]  Chest press  Shoulder care, reaching, carrying, lifting, house/yardwork, driving/computer work.  [] (86220) Therapist is in constant attendance of 2 or more patients providing skilled therapy interventions, but not providing any significant amount of measurable one-on-one time to either patient, for improvements in cervical, scapular, scapulothoracic and UE control with self care, reaching, carrying, lifting, house/yardwork, driving, computer work. Therapeutic Activities:    [x] (08815 or 35205) Provided verbal/tactile cueing for activities related to improving balance, coordination, kinesthetic sense, posture, motor skill, proprioception and motor activation to allow for proper function of scapular, scapulothoracic and UE control with self care, carrying, lifting, driving/computer work.      Home Exercise Program:    [] (36488) Reviewed/Progressed HEP activities related to strengthening, flexibility, endurance, ROM of scapular, scapulothoracic and UE control with self care, reaching, carrying, lifting, house/yardwork, driving/computer work  [] (27595) Reviewed/Progressed HEP activities related to improving balance, coordination, kinesthetic sense, posture, motor skill, proprioception of scapular, scapulothoracic and UE control with self care, reaching, carrying, lifting, house/yardwork, driving/computer work      Manual Treatments:  PROM / STM / Oscillations-Mobs:  G-I, II, III, IV (PA's, Inf., Post.)  [x] (57595) Provided manual therapy to mobilize soft tissue/joints of cervical/CT, scapular GHJ and UE for the purpose of modulating pain, promoting relaxation,  increasing ROM, reducing/eliminating soft tissue swelling/inflammation/restriction, improving soft tissue extensibility and allowing for proper ROM for normal function with self care, reaching, carrying, lifting, house/yardwork, driving/computer work      Charges:  Timed Code Treatment Minutes: 44   Total Treatment Minutes: 44       [] EVAL - LOW (98919)   [] EVAL - MOD Limitations/Impairments:  []Sitting []Standing   []Transfers  []Sleeping   []Reaching []Lifting   []ADLs []Housework  []Driving []Job related tasks  []Sports/Recreation []Other:    ASSESSMENT:    Pt reported no pain through session. Great response to new [facility]x machines in [facility]. Plan to continue with manual and strength until goals met. Treatment/Activity Tolerance:  [x] Pt able to complete treatment [] Patient limited by fatique  [] Patient limited by pain  [] Patient limited by other medical complications  [] Other:     Prognosis:  [x] Good [] Fair  [] Poor    Patient Requires Follow-up: [x] Yes  [] No    Return to Play:    [x]  N/A     []  Stage 1: Intro to Strength   []  Stage 2: Dynamic Strength and Intro to Plyometrics   []  Stage 3: Advanced Plyometrics and Intro to Throwing   []  Stage 4: Sport specific Training/Return to Sport     []  Ready to Return to Play, [employer]   []  Not Ready for Return to Sports   Comments:      PLAN: See eval. PT 1x / week for 6 weeks. [x] Continue per plan of care [] Alter current plan (see comments)  [] Plan of care initiated [] Hold pending MD visit [] Discharge    Electronically signed by: Ga Aguilar PT, DPT      Note: If patient does not return for scheduled/ recommended follow up visits, this note will serve as a discharge from care along with most recent update on progress.

## 2021-04-13 ENCOUNTER — HOSPITAL ENCOUNTER (OUTPATIENT)
Dept: PHYSICAL THERAPY | Age: 71
Setting detail: THERAPIES SERIES
Discharge: HOME | End: 2021-04-13
Payer: MEDICARE

## 2021-04-13 PROCEDURE — 97110 THERAPEUTIC EXERCISES: CPT

## 2021-04-13 PROCEDURE — 97140 MANUAL THERAPY 1/> REGIONS: CPT

## 2021-04-13 PROCEDURE — 97530 THERAPEUTIC ACTIVITIES: CPT

## 2021-04-13 NOTE — PLAN OF CARE
Progress Report: [x]  Yes  []  No     Date Range for reporting period:  Beginning: 3/25/21  Endin21      Visit # Insurance Allowable Auth required? Date Range    + 3/4 4  TE, TA, man, neuro, US  [x]  Yes  []  No 3/25//21-21     Latex Allergy:  [x]NO      []YES  Preferred Language for Healthcare:   [x]English       []other:    Functional Scale:       Date assessed:  Quick DASH: raw score = 18; dysfunction = 16%              3/25/21  Quick DASH: raw score = 15 ; dysfunction = 9.1%  21    Pain level:  1/10     SUBJECTIVE:  Pt reports that she is feeling good today, she is feeling a little sore. She states that certain movements with rotation of her forearm are still aggravating her symptoms. Specifically she mentioned turning the key on her grandfather clock CW with her right arm. Rohit noticed less discomfort in the mornings. Does feel therapy has helped.      OBJECTIVE:    Observation:    Test measurements:     :  Strength (0-5) Left Right    Shoulder Shrug (C4)       Shoulder Flex   5   Shoulder Abd (C5)   5   Shoulder ER   5   Shoulder IR   5         RESTRICTIONS/PRECAUTIONS:  Raynaud's    Exercises/Interventions:   Therapeutic Exercise (46908) Resistance / level Sets / Seconds  Reps Notes/Cues   Chin tuck in sitting and supine      Scap retract      UT stretch   LS stretch      Wall push up     UBE Forward/retro 3 min/3 min  Level 2           Pulleys abd        High level strength- [facility]  Chest press  Shoulder press  LPD  Mid flys                            Therapeutic Activities (41091)       Wringing wood and blue bar at 90 deg flexion    Wringing with therabar at 90 deg  U's both ways     Key turn with facilitation with therabar       Bibiana Leach          X 2 min  x2 min    x2 min    Supination/pronation with wood and blue bar at 90 deg Holding at the end   X 3 min R    LPD  Mid row  ER  IR    Digiflex:  ind fingers  All fingers            Neuromuscular Re-ed (72985)       High level weight bearing and proprioception                                   Manual Intervention (84276)       hawkgrips #9  to extensor mass brushes and sweeps  Manual release to wrist extensors and radial deviators   X 15 min     Post Cap mobs       Thoracic/Rib manipualtion       CT MT/Mobs       PROM MT                  Modalities:   POSSIBLY ULTRASOUND    Pt. Education:  4/13:  Reassessed goals, discussed progress made and areas remaining for improvement, issued outcome measure and reviewed new score with pt as compared to eval. Discussed HEP, general workout plan and mechanics for activities/lifts to reduce flare ups. She was also educated on the pathophysiology behind why she had numbness and tingling.       3/25: pt educated on diagnosis, prognosis and expectations for rehab, all pt questions were answered, reviewed current exercise program and made correction to sitting chin tuck     Home Exercise Program:  Access Code: [email or web address]  URL: [email or web address]  Date: 04/13/2021  Prepared by: Tamsara Grammetta    Exercises  Seated Wrist Flexion Extension PROM - 1 x daily - 7 x weekly - 10 reps - 3 sets  Seated Wrist Radial Deviation with Dumbbell - 1 x daily - 7 x weekly - 10 reps - 3 sets  Seated Wrist Flexion and Extension with Towel Twist - 1 x daily - 7 x weekly - 10 reps - 3 sets  Forearm Pronation and Supination with Hammer - 1 x daily - 7 x weekly - 10 reps - 3 sets  Seated Wrist Flexion with Dumbbell - 1 x daily - 7 x weekly - 10 reps - 3 sets  Seated Wrist Extension with Dumbbell - 1 x daily - 7 x weekly - 10 reps - 3 sets      Access Code: [email or web address]  URL: [email or web address]  Date: 03/25/2021  Prepared by: Tamea Grammes    Exercises  Seated Scapular Retraction - 1 x daily - 7 x weekly - 10 reps - 3 sets  Wall Push Up - 1 x daily - 7 x weekly - 10 reps - 3 sets  Seated Levator Scapulae Stretch - 1 x daily - 7 x weekly - 10 reps - 3 sets  Seated Cervical Sidebending Stretch - 1 x daily - 7 x weekly - 10 reps - 3 sets      Therapeutic Exercise and NMR EXR  [x] (47684) Provided verbal/tactile cueing for activities related to strengthening, flexibility, endurance, ROM  for improvements in scapular, scapulothoracic and UE control with self care, reaching, carrying, lifting, house/yardwork, driving/computer work.    [] (85976) Provided verbal/tactile cueing for activities related to improving balance, coordination, kinesthetic sense, posture, motor skill, proprioception  to assist with  scapular, scapulothoracic and UE control with self care, reaching, carrying, lifting, house/yardwork, driving/computer work.  [] (97634) Therapist is in constant attendance of 2 or more patients providing skilled therapy interventions, but not providing any significant amount of measurable one-on-one time to either patient, for improvements in cervical, scapular, scapulothoracic and UE control with self care, reaching, carrying, lifting, house/yardwork, driving, computer work. Therapeutic Activities:    [x] (36301 or 64343) Provided verbal/tactile cueing for activities related to improving balance, coordination, kinesthetic sense, posture, motor skill, proprioception and motor activation to allow for proper function of scapular, scapulothoracic and UE control with self care, carrying, lifting, driving/computer work.      Home Exercise Program:    [] (70655) Reviewed/Progressed HEP activities related to strengthening, flexibility, endurance, ROM of scapular, scapulothoracic and UE control with self care, reaching, carrying, lifting, house/yardwork, driving/computer work  [] (94059) Reviewed/Progressed HEP activities related to improving balance, coordination, kinesthetic sense, posture, motor skill, proprioception of scapular, scapulothoracic and UE control with self care, reaching, carrying, lifting, house/yardwork, driving/computer work      Manual Treatments:  PROM / STM / Oscillations-Mobs:  G-I, II, III, IV (PA's, Inf., Post.)  [x] (77662) Provided manual therapy to mobilize soft tissue/joints of cervical/CT, scapular GHJ and UE for the purpose of modulating pain, promoting relaxation,  increasing ROM, reducing/eliminating soft tissue swelling/inflammation/restriction, improving soft tissue extensibility and allowing for proper ROM for normal function with self care, reaching, carrying, lifting, house/yardwork, driving/computer work      Charges:  Timed Code Treatment Minutes: 50   Total Treatment Minutes: 50       [] EVAL - LOW (73260)   [] EVAL - MOD (42892)  [] EVAL - HIGH (42491)  [] RE-EVAL (69837)  [x] HY(59875) x 1     [] Ionto  [] NMR (59140) x      [] Vaso  [x] Manual (26849) x 1     [] Ultrasound:  [x] TA x 1      [] Mech Traction (44478)  [] Home Management Training x    [] ES (un) (26977):   [] Aquatic    [] ES(attended) (74243)  [] Other:                     GOALS:     Short Term Goals: To be achieved in: 2 weeks  1. Independent in HEP and progression per patient tolerance, in order to prevent re-injury. [] Progressing: [x] Met: [] Not Met: [] Adjusted  2. Patient will have a decrease in pain to facilitate improvement in movement, function, and ADLs as indicated by Functional Deficits. [] Progressing: [x] Met: [] Not Met: [] Adjusted    Long Term Goals: To be achieved in: 6 weeks  1. Disability index score of 0% or less on the Quick DASH to assist with reaching prior level of function. [] Progressing: [] Met: [x] Not Met: [] Adjusted  2. Patient will demonstrate an increase in Strength in R UE to 5/5 to allow for proper functional mobility as indicated by patients Functional Deficits. [] Progressing: [x] Met: [] Not Met: [] Adjusted  3. Patient will return to functional activities including working out with heavy weights without increased symptoms or restriction. [x] Progressing: [] Met: [] Not Met: [] Adjusted  4.  Pt will be able to wake up from a full night's sleep without stiffness in her elbow to demo return to PLOF. [] Progressing: [x] Met: [] Not Met: [] Adjusted    Overall Progression Towards Functional goals/ Treatment Progress Update:  [x] Patient is progressing as expected towards functional goals listed. [] Progression is slowed due to complexities/Impairments listed. [] Progression has been slowed due to co-morbidities. [] Plan just implemented, too soon to assess goals progression <30days   [] Goals require adjustment due to lack of progress  [] Patient is not progressing as expected and requires additional follow up with physician  [] Other    Persisting Functional Limitations/Impairments:  []Sitting []Standing   []Transfers  []Sleeping   []Reaching []Lifting   []ADLs []Housework  []Driving []Job related tasks  [x]Sports/Recreation []Other:    ASSESSMENT:    Pt is a 68 yo female who complained of pain in forearm following a trip to [place] last year. She reported occasional numbness and tingling. Pain limited her participation in exercise classes and some ADL/IADLs. She has met the majority of her goals but is still progressing towards no pain during heavy lifting activities. She has responded well to soft tissue mobilization and plans to use [facility] machines post discharge. Therapist has advised patient to follow up with MD if pain continues or worsens.       Treatment/Activity Tolerance:  [x] Pt able to complete treatment [] Patient limited by fatique  [] Patient limited by pain  [] Patient limited by other medical complications  [] Other:     Prognosis:  [x] Good [] Fair  [] Poor    Patient Requires Follow-up: [] Yes  [x] No    Return to Play:    [x]  N/A     []  Stage 1: Intro to Strength   []  Stage 2: Dynamic Strength and Intro to Plyometrics   []  Stage 3: Advanced Plyometrics and Intro to Throwing   []  Stage 4: Sport specific Training/Return to Sport     []  Ready to Return to Play, [employer]   []  Not Ready for Return to Sports   Comments:      PLAN:   [] Continue per plan of care [] Alter current plan (see comments)  [] Plan of care initiated [] Hold pending MD visit [x] Discharge    Electronically signed by:  Jorge Brown, SPT    Therapist was present, directed the patient's care, made skilled judgement, and was responsible for assessment and treatment of the patient. Myra Bowen PT, DPT      Note: If patient does not return for scheduled/ recommended follow up visits, this note will serve as a discharge from care along with most recent update on progress.

## 2021-06-16 ENCOUNTER — OFFICE VISIT (OUTPATIENT)
Dept: VASCULAR SURGERY | Age: 71
End: 2021-06-16
Payer: MEDICARE

## 2021-06-16 VITALS
BODY MASS INDEX: 18.32 KG/M2 | WEIGHT: 114 LBS | SYSTOLIC BLOOD PRESSURE: 162 MMHG | HEIGHT: 66 IN | DIASTOLIC BLOOD PRESSURE: 78 MMHG

## 2021-06-16 DIAGNOSIS — I73.00 RAYNAUD'S PHENOMENON WITHOUT GANGRENE: Primary | ICD-10-CM

## 2021-06-16 PROCEDURE — 99204 OFFICE O/P NEW MOD 45 MIN: CPT | Performed by: SURGERY

## 2021-06-16 NOTE — LETTER
[facility]  [address]  Phone: [phone]  Fax: [phone]           Deja Rebollar MD      June 16, 2021     Patient: Ty James   MR Number: [id number]   YOB: 1950   Date of Visit: 6/16/2021       Dear Nani Valadez: Thank you for referring Wilton Yost to me for evaluation/treatment. Certainly Mrs. Alexandra Mancini has Raynaud's but she is doing well with no risk of long-term problems. I advised her about the disease process and how to manage it. She will return to see me in future as needed. If you have questions, please do not hesitate to call me.     Sincerely,        Deja Rebollar MD    CC providers:  Jacob Tom, [address] [facility] [address]  [place]

## 2021-06-16 NOTE — PROGRESS NOTES
History Narrative    Not on file     Social Determinants of Health     Financial Resource Strain:     Difficulty of Paying Living Expenses:    Food Insecurity:     Worried About Running Out of Food in the Last Year:     [address] in the Last Year:    Transportation Needs:     Lack of Transportation (Medical):  Lack of Transportation (Non-Medical):    Physical Activity:     Days of Exercise per Week:     Minutes of Exercise per Session:    Stress:     Feeling of Stress :    Social Connections:     Frequency of Communication with Friends and Family:     Frequency of Social Gatherings with Friends and Family:     Attends Sikhism Services:     Active Member of Clubs or Organizations:     Attends Club or Organization Meetings:     Marital Status:    Intimate Partner Violence:     Fear of Current or Ex-Partner:     Emotionally Abused:     Physically Abused:     Sexually Abused:      Family History   Problem Relation Age of Onset    High Blood Pressure Other     Heart Disease Other          Review of Systems   All other systems reviewed and are negative. 15 pt ROS confirmed personally by this MD    Objective:   Physical Exam  Vitals and nursing note reviewed. Constitutional:       Comments: Thin - healthy appearing   HENT:      Head: Normocephalic and atraumatic. Right Ear: External ear normal.      Left Ear: External ear normal.      Nose: Nose normal.      Mouth/Throat:      Mouth: Mucous membranes are moist.      Pharynx: Oropharynx is clear. Eyes:      Extraocular Movements: Extraocular movements intact. Conjunctiva/sclera: Conjunctivae normal.   Cardiovascular:      Rate and Rhythm: Normal rate and regular rhythm. Heart sounds: Normal heart sounds. Pulmonary:      Effort: Pulmonary effort is normal.      Breath sounds: Normal breath sounds. Abdominal:      General: Abdomen is flat. Bowel sounds are normal.      Palpations: Abdomen is soft. There is no mass. Hernia: No hernia is present. Musculoskeletal:      Cervical back: Normal range of motion. Right lower leg: No edema. Left lower leg: No edema. Skin:     General: Skin is dry. Capillary Refill: Capillary refill takes less than 2 seconds. B toes blue with blanching and respond to warming by MD - pink color     Findings: No lesion (no livedo reticularis) or rash. Neurological:      General: No focal deficit present. Mental Status: She is alert and oriented to person, place, and time. Cranial Nerves: No cranial nerve deficit. Sensory: No sensory deficit. Motor: No weakness. Coordination: Coordination normal.      Gait: Gait normal.   Psychiatric:         Mood and Affect: Mood normal.         Thought Content: Thought content normal.         Judgment: Judgment normal.       Pulses:   R bruit  L bruit   2   carotid 2    2   brachial 2    2   radial 2    2   femoral 2    2   popliteal 2    2   posterior tibial 2    2   dorsalis pedis 2    na   bypass graft na        R DEE L   1+ DP 1+   1+ PT 1+    TRAVIS        Assessment:      Raynaud's Syndrome - spontaneous/idiopathic. No h/o of tissue loss/ulceration or associated pathology. Plan:      Observation. Recommended avoidance of inciting events including exposure to cold weather, activities and emotional distress that stimulate this response. Reassured that the chances of progression to any tissue loss or limb loss or digital loss is extremely low with no associated pathology or complex. Would not start calcium channel blockers in this case due to minimal symptomatology and risk of orthostatic hypotension symptomatology. Patient understands this well and is appreciative the input. She will follow my recommendations and return to see me in the future only as necessary.

## 2021-06-30 ENCOUNTER — TELEPHONE (OUTPATIENT)
Dept: ENT CLINIC | Age: 71
End: 2021-06-30

## 2021-06-30 NOTE — TELEPHONE ENCOUNTER
Pt.called stating can she have a couple of DOME covers for her hearing aids. Her apt. Is on 7/7 she says can these be available at her next apt.

## 2021-07-07 ENCOUNTER — OFFICE VISIT (OUTPATIENT)
Dept: ENT CLINIC | Age: 71
End: 2021-07-07
Payer: MEDICARE

## 2021-07-07 VITALS — TEMPERATURE: 97.3 F

## 2021-07-07 DIAGNOSIS — H61.23 EXCESSIVE CERUMEN IN BOTH EAR CANALS: Primary | ICD-10-CM

## 2021-07-07 DIAGNOSIS — H90.0 CONDUCTIVE HEARING LOSS OF BOTH EARS: ICD-10-CM

## 2021-07-07 PROCEDURE — 69210 REMOVE IMPACTED EAR WAX UNI: CPT | Performed by: OTOLARYNGOLOGY

## 2021-07-07 NOTE — PATIENT INSTRUCTIONS
1. Please schedule an audiogram (hearing test), if your hearing is not back to your usual ability, or if hearing loss or tinnitus (ringing or other noise) persists, despite removal of ear wax,.  2. You may use an over the counter ear wax removal kit (such as Murine, Bausch and Lomb, NeilMed, or Debrox wax removal system) for ear wax removal, as needed. 3. It may help to use Debrox (OTC) for 4 days prior to future visits for ear cleaning. This may soften your ear wax and facilitate removal of the wax. NO Q-TIPS OR OTHER INSTRUMENTS/OBJECTS IN THE EARS   You should never clean your ears with a Q-tip, cotton tipped applicator, Zeynep pin, paper clip, safety pin, pen cap, or any other instrument. This will tend to push wax in deeper and pack the ear canal with wax. There is a high risk and danger of this practice, especially rupture of ear drum, dislocation or other damage to ossicles, and permanent, irreversible, and irreparable hearing loss. It may cause inflammation and irritation of the ear canal and cause itching or pain. I recommend only use of one the several ear wax removal kits available \"over the counter\" if you feel a need to try to remove ear wax. For example, Murine, Bausch and Lomb, NeilMed, or Debrox ear wax removal kits may be used for ear wax removal, as needed. No other methods should be self used for cleaning your ears.

## 2021-07-07 NOTE — PROGRESS NOTES
Chief Complaint   Patient presents with    Cerumen Impaction    Hearing Loss       HISTORY:    Wimla Salinas stated that the hearing is decreased in both ears, which are plugged up with ear wax. EXAMINATION:    Both external ear canals were occluded by cerumen impaction, obscuring visualization of the TMs. PROCEDURE - REMOVAL OF BILATERAL CERUMEN IMPACTION:   The cerumen impaction was removed from both of the EACs, under otomicroscopy visualization, with instrumentation, using a Billeau wire loop and a Khan suction. After successful cerumen removal, the EACs appeared to be normal and clear bilaterally without mass, exudate, or edema. The tympanic membranes appeared to be normal, including normal pneumatic mobility bilaterally. There was no evidence of acute disease. Wilma Salinas reported improved hearing, back to usual level, after cerumen removal.            IMPRESSION / Saman Clear / Ernie Amis / PROCEDURES:       Wilma Salinas was seen today for cerumen impaction and hearing loss. Diagnoses and all orders for this visit:    Excessive cerumen in both ear canals    Conductive hearing loss of both ears         RECOMMENDATIONS / PLAN:   1. See Patient Instructions on file for this visit. 2. Return for recurrence of symptoms of excessive ear wax, or any further ear nose throat or sinus problems.

## 2021-07-21 ENCOUNTER — OFFICE VISIT (OUTPATIENT)
Dept: FAMILY MEDICINE CLINIC | Age: 71
End: 2021-07-21
Payer: MEDICARE

## 2021-07-21 VITALS
WEIGHT: 116 LBS | OXYGEN SATURATION: 98 % | SYSTOLIC BLOOD PRESSURE: 132 MMHG | BODY MASS INDEX: 18.72 KG/M2 | DIASTOLIC BLOOD PRESSURE: 74 MMHG | HEART RATE: 82 BPM

## 2021-07-21 DIAGNOSIS — Z76.89 ENCOUNTER TO ESTABLISH CARE: Primary | ICD-10-CM

## 2021-07-21 DIAGNOSIS — Z97.4 PRESENCE OF EXTERNAL HEARING AID: ICD-10-CM

## 2021-07-21 PROCEDURE — 99213 OFFICE O/P EST LOW 20 MIN: CPT | Performed by: FAMILY MEDICINE

## 2021-07-21 SDOH — ECONOMIC STABILITY: FOOD INSECURITY: WITHIN THE PAST 12 MONTHS, THE FOOD YOU BOUGHT JUST DIDN'T LAST AND YOU DIDN'T HAVE MONEY TO GET MORE.: NEVER TRUE

## 2021-07-21 SDOH — ECONOMIC STABILITY: FOOD INSECURITY: WITHIN THE PAST 12 MONTHS, YOU WORRIED THAT YOUR FOOD WOULD RUN OUT BEFORE YOU GOT MONEY TO BUY MORE.: NEVER TRUE

## 2021-07-21 ASSESSMENT — ENCOUNTER SYMPTOMS
ABDOMINAL PAIN: 0
SINUS PRESSURE: 0
CHEST TIGHTNESS: 0
DIARRHEA: 0
FACIAL SWELLING: 0
SHORTNESS OF BREATH: 0
COUGH: 0
NAUSEA: 0
VOMITING: 0
WHEEZING: 0
SORE THROAT: 0

## 2021-07-21 ASSESSMENT — PATIENT HEALTH QUESTIONNAIRE - PHQ9
2. FEELING DOWN, DEPRESSED OR HOPELESS: 0
SUM OF ALL RESPONSES TO PHQ9 QUESTIONS 1 & 2: 0
SUM OF ALL RESPONSES TO PHQ QUESTIONS 1-9: 0
SUM OF ALL RESPONSES TO PHQ QUESTIONS 1-9: 0
1. LITTLE INTEREST OR PLEASURE IN DOING THINGS: 0
SUM OF ALL RESPONSES TO PHQ QUESTIONS 1-9: 0

## 2021-07-21 ASSESSMENT — SOCIAL DETERMINANTS OF HEALTH (SDOH): HOW HARD IS IT FOR YOU TO PAY FOR THE VERY BASICS LIKE FOOD, HOUSING, MEDICAL CARE, AND HEATING?: NOT HARD AT ALL

## 2021-07-21 NOTE — PROGRESS NOTES
2021     Rocco Breen (:  1950) is a 66 y.o. female, here for evaluation of the following medical concerns:    HPI      Encounter to establish care- patient presented to the clinic to establish care with a new pcp. The patient's previous pcp is retired. The patient is feeling well today and denied a new problem. She has several chronic medical conditions to discuss today. She denied tobacco use, alcohol use, or drug use. Bilateral hearing- hearing aids doing well, not a new concern. Today, denied chest pain, sob, nv, or diarrhea. Review of Systems   Constitutional: Negative for activity change, fatigue, fever and unexpected weight change. HENT: Negative for congestion, ear pain, facial swelling, sinus pressure and sore throat. Respiratory: Negative for cough, chest tightness, shortness of breath and wheezing. Cardiovascular: Negative for chest pain and leg swelling. Gastrointestinal: Negative for abdominal pain, diarrhea, nausea and vomiting. Endocrine: Negative for cold intolerance, heat intolerance, polydipsia and polyphagia. Musculoskeletal: Negative for arthralgias. Skin: Negative for rash. Neurological: Negative for dizziness, tremors, syncope, numbness and headaches. Psychiatric/Behavioral: Negative for dysphoric mood. The patient is not nervous/anxious. Prior to Visit Medications    Medication Sig Taking? Authorizing Provider   triamcinolone (KENALOG) 0.1 % cream Apply topically  Daily. Apply with Q tip in each ear at bedtime Yes Earle Bello MD        Social History     Tobacco Use    Smoking status: Never Smoker    Smokeless tobacco: Never Used   Substance Use Topics    Alcohol use: Yes     Comment: occassionally        Vitals:    21 0931 21 0950   BP: (!) 142/70 132/74   Site: Right Upper Arm    Position: Sitting    Cuff Size: Medium Adult    Pulse: 82    SpO2: 98%    Weight: 116 lb (52.6 kg)      Estimated body mass index is 18.72

## 2022-01-12 ENCOUNTER — OFFICE VISIT (OUTPATIENT)
Dept: ENT CLINIC | Age: 72
End: 2022-01-12
Payer: MEDICARE

## 2022-01-12 ENCOUNTER — PROCEDURE VISIT (OUTPATIENT)
Dept: AUDIOLOGY | Age: 72
End: 2022-01-12

## 2022-01-12 VITALS
SYSTOLIC BLOOD PRESSURE: 186 MMHG | HEART RATE: 91 BPM | TEMPERATURE: 97.1 F | DIASTOLIC BLOOD PRESSURE: 80 MMHG | OXYGEN SATURATION: 98 %

## 2022-01-12 DIAGNOSIS — H90.3 SENSORINEURAL HEARING LOSS (SNHL) OF BOTH EARS: ICD-10-CM

## 2022-01-12 DIAGNOSIS — H90.0 CONDUCTIVE HEARING LOSS OF BOTH EARS: ICD-10-CM

## 2022-01-12 DIAGNOSIS — H61.23 EXCESSIVE CERUMEN IN BOTH EAR CANALS: Primary | ICD-10-CM

## 2022-01-12 PROCEDURE — 69210 REMOVE IMPACTED EAR WAX UNI: CPT | Performed by: OTOLARYNGOLOGY

## 2022-01-12 PROCEDURE — V5267 HEARING AID SUP/ACCESS/DEV: HCPCS | Performed by: AUDIOLOGIST

## 2022-01-12 NOTE — PROGRESS NOTES
Walk-in payment for hearing aid accessories. Paid 8.00 for domes.  Cpt code:  @ 8.00 for 1 pack of domes

## 2022-05-08 RX ORDER — POLYMYXIN B SULFATE AND TRIMETHOPRIM 1; 10000 MG/ML; [USP'U]/ML
1 SOLUTION OPHTHALMIC EVERY 4 HOURS
Qty: 1 EACH | Refills: 0 | Status: SHIPPED | OUTPATIENT
Start: 2022-05-08 | End: 2022-05-18

## 2022-05-24 ENCOUNTER — TELEPHONE (OUTPATIENT)
Dept: AUDIOLOGY | Age: 72
End: 2022-05-24

## 2022-05-24 NOTE — TELEPHONE ENCOUNTER
Usually buy the domes for hearing aids, but hard to get them. Coming in on Thursday at 3:15PM to see Dr. Sedrick Grimes. Erica oHff wants to know if you have any extra that she can buy? And if so, can she can buy some while in the office.

## 2022-05-26 ENCOUNTER — OFFICE VISIT (OUTPATIENT)
Dept: ENT CLINIC | Age: 72
End: 2022-05-26
Payer: MEDICARE

## 2022-05-26 VITALS
DIASTOLIC BLOOD PRESSURE: 75 MMHG | OXYGEN SATURATION: 98 % | HEART RATE: 73 BPM | TEMPERATURE: 97.5 F | SYSTOLIC BLOOD PRESSURE: 156 MMHG

## 2022-05-26 DIAGNOSIS — H90.0 CONDUCTIVE HEARING LOSS OF BOTH EARS: ICD-10-CM

## 2022-05-26 DIAGNOSIS — H61.23 IMPACTED CERUMEN OF BOTH EARS: ICD-10-CM

## 2022-05-26 DIAGNOSIS — H61.23 EXCESSIVE CERUMEN IN BOTH EAR CANALS: Primary | ICD-10-CM

## 2022-05-26 PROCEDURE — 69210 REMOVE IMPACTED EAR WAX UNI: CPT | Performed by: OTOLARYNGOLOGY

## 2022-05-26 NOTE — PROGRESS NOTES
Chief Complaint   Patient presents with    Ear Problem     ear cleaning       HISTORY:    Ai Cooley stated that the hearing is decreased in both ears, which are plugged up with ear wax. Has had a scratchy throat but better today. Requested that throat be checked. EXAMINATION:    Both external ear canals were occluded by cerumen impaction, obscuring visualization of the TMs. PROCEDURE - REMOVAL OF BILATERAL CERUMEN IMPACTION:   The cerumen impaction was removed from both of the EACs, under otomicroscopy visualization, with instrumentation, using a Khan ear suction   After successful cerumen removal, the EACs appeared to be normal and clear bilaterally without mass, exudate, or edema. The tympanic membranes appeared to be normal, including normal pneumatic mobility bilaterally. There was no evidence of acute disease. Ai Cooley reported improved hearing, back to usual level, after cerumen removal, and replacement of hearing aids. Examination   Throat:  clear         IMPRESSION / Teresa Kansas / Juan Manuelris Mast / PROCEDURES:       Ai Cooley was seen today for ear problem. Diagnoses and all orders for this visit:    Excessive cerumen in both ear canals    Conductive hearing loss of both ears          RECOMMENDATIONS / PLAN:   1. See Patient Instructions on file for this visit. 2. Return for recurrence of symptoms of excessive ear wax, or any further ear nose throat or sinus problems.

## 2022-05-26 NOTE — PATIENT INSTRUCTIONS
1. Schedule an audiogram (hearing test), if your hearing is not back to your usual ability, or if hearing loss or tinnitus (ringing or other noise) persists, despite removal of ear wax,.  2. Schedule an appointment for ear recheck and possible cleaning in the future if your hearing is decreased, or you have a sensation of ear wax build up or are told you have ear wax build up by your primary physician or other health care provider. 3. You may use an over the counter ear wax removal kit (such as Murine, Bausch and Lomb, NeilMed, or Debrox wax removal system) for ear wax removal, as needed. 4. It may help to use Debrox (OTC) for 4 days prior to future visits for ear cleaning. This may soften your ear wax and facilitate removal of the wax. NO Q-TIPS OR OTHER INSTRUMENTS/OBJECTS IN THE EARS   You should never clean your ears with a Q-tip, cotton tipped applicator, Zeynep pin, paper clip, safety pin, pen cap, or any other instrument. This will tend to push wax in deeper and pack the ear canal with wax. There is a high risk and danger of this practice, especially rupture of ear drum, dislocation or other damage to ossicles, and permanent, irreversible, and irreparable hearing loss. It may cause inflammation and irritation of the ear canal and cause itching or pain. I recommend only use of one the several ear wax removal kits available \"over the counter\" if you feel a need to try to remove ear wax. For example, Murine, Bausch and Lomb, NeilMed, or Debrox ear wax removal kits may be used for ear wax removal, as needed. No other methods should be self used for cleaning your ears.

## 2022-07-11 ENCOUNTER — E-VISIT (OUTPATIENT)
Dept: FAMILY MEDICINE CLINIC | Age: 72
End: 2022-07-11
Payer: MEDICARE

## 2022-07-11 ENCOUNTER — TELEPHONE (OUTPATIENT)
Dept: FAMILY MEDICINE CLINIC | Age: 72
End: 2022-07-11

## 2022-07-11 DIAGNOSIS — J01.00 ACUTE NON-RECURRENT MAXILLARY SINUSITIS: Primary | ICD-10-CM

## 2022-07-11 PROCEDURE — 99421 OL DIG E/M SVC 5-10 MIN: CPT | Performed by: FAMILY MEDICINE

## 2022-07-11 RX ORDER — GUAIFENESIN AND PSEUDOEPHEDRINE HCL 1200; 120 MG/1; MG/1
1 TABLET, EXTENDED RELEASE ORAL 2 TIMES DAILY PRN
Qty: 20 TABLET | Refills: 0 | Status: SHIPPED | OUTPATIENT
Start: 2022-07-11

## 2022-07-11 RX ORDER — AMOXICILLIN 875 MG/1
875 TABLET, COATED ORAL 2 TIMES DAILY
Qty: 20 TABLET | Refills: 0 | Status: SHIPPED | OUTPATIENT
Start: 2022-07-11 | End: 2022-07-21

## 2022-07-11 ASSESSMENT — LIFESTYLE VARIABLES: SMOKING_STATUS: NO, I'VE NEVER SMOKED

## 2022-07-11 NOTE — TELEPHONE ENCOUNTER
Patient called stating nasal congestion, nasal drainage, cough, left ear feels clogged and pressure in her head if she bends over for the last two weeks.  Patient has taken 2 home COVID test in the last two weeks and both were negative

## 2022-07-11 NOTE — PROGRESS NOTES
Patient initiated an E-visit with a 15 day history of nasal congestion with clear nasal mucus, mildly productive cough, left ear clicking. Her symptoms are not improving. I have diagnosed a Sinusitis and have prescribed Amoxicillin 875 mg BID for 10 days and Mucinex-D. I suggested that she do a COVID test if she has not already.

## 2022-09-21 ENCOUNTER — OFFICE VISIT (OUTPATIENT)
Dept: ENT CLINIC | Age: 72
End: 2022-09-21
Payer: MEDICARE

## 2022-09-21 VITALS — SYSTOLIC BLOOD PRESSURE: 171 MMHG | HEART RATE: 66 BPM | TEMPERATURE: 98 F | DIASTOLIC BLOOD PRESSURE: 75 MMHG

## 2022-09-21 DIAGNOSIS — H60.8X3 CHRONIC ECZEMATOUS OTITIS EXTERNA OF BOTH EARS: Primary | Chronic | ICD-10-CM

## 2022-09-21 PROCEDURE — 99213 OFFICE O/P EST LOW 20 MIN: CPT | Performed by: OTOLARYNGOLOGY

## 2022-09-21 PROCEDURE — 1123F ACP DISCUSS/DSCN MKR DOCD: CPT | Performed by: OTOLARYNGOLOGY

## 2022-09-21 ASSESSMENT — ENCOUNTER SYMPTOMS
SORE THROAT: 0
SINUS PAIN: 0
RHINORRHEA: 0

## 2022-09-21 NOTE — PROGRESS NOTES
[facility]       PCP:  Kimberly Castro DO      CHIEF COMPLAINT  Chief Complaint   Patient presents with    Ear Problem       HISTORY OF PRESENT ILLNESS      Jyoti Ware is a 67 y.o. female comes today for ear recheck and possible ear cleaning. She stated that she has occasional itching, but has not needed the Dermotic oil for a while. Hearing seems to be at usual level. REVIEW OF SYSTEMS   Review of Systems   Constitutional:  Negative for chills and fever. HENT:  Negative for ear discharge, ear pain, rhinorrhea, sinus pain and sore throat. PAST MEDICAL HISTORY    Past Medical History:   Diagnosis Date    Basal cell cancer     Preventative health care     Raynaud's disease        Past Surgical History:   Procedure Laterality Date    COLONOSCOPY  2008    FINGER TRIGGER RELEASE Right 5/27/14    ring finger    SKIN CANCER EXCISION           EXAMINATION    Vitals:    09/21/22 1320   BP: (!) 171/75   Site: Right Upper Arm   Position: Sitting   Cuff Size: Medium Adult   Pulse: 66   Temp: 98 °F (36.7 °C)   TempSrc: Temporal     General:  WDWN, NAD, alert and oriented  Face: There was no swelling or lesions detected. Voice: Normal with no hoarseness or hot potato voice. (+) Ears:  Otomicroscopy bilaterally. Mild skin changes c/w chronic eczematous otitis externa of both ears. The external ears, mastoids, TMs and EACs appeared to be normal, including normal pneumatic mobility of the TMs. Binaural binocular otomicroscopy performed. Nose: The external nose, nasal septum, turbinates, secretions, and mucosa appeared to be normal.   Sinuses:  Maxillary and frontal sinuses were nontender to palpation and percussion.     Oral cavity:  Mucosa, secretions, tongue, and gingiva appeared to be normal.   Oropharynx:  The palatine tonsils, hard and soft palates, uvula, tongue, posterior oropharyngeal wall, mucosa and secretions hearing loss. It may cause inflammation and irritation of the ear canal and cause itching or pain. I recommend only use of one the several ear wax removal kits available \"over the counter\" if you feel a need to try to remove ear wax. For example, Murine, Bausch and Lomb, NeilMed, or Debrox ear wax removal kits may be used for ear wax removal, as needed. No other methods should be self used for cleaning your ears.

## 2022-09-21 NOTE — PATIENT INSTRUCTIONS
Schedule an audiogram (hearing test), if your hearing is not back to your usual ability, or if hearing loss or tinnitus (ringing or other noise) persists, despite removal of ear wax,. Schedule an appointment for ear recheck and possible cleaning in the future if your hearing is decreased, or you have a sensation of ear wax build up or are told you have ear wax build up by your primary physician or other health care provider. You may use an over the counter ear wax removal kit (such as Murine, Bausch and Lomb, NeilMed, or Debrox wax removal system) for ear wax removal, as needed. It may help to use Debrox (OTC) for 4 days prior to future visits for ear cleaning. This may soften your ear wax and facilitate removal of the wax. NO Q-TIPS OR OTHER INSTRUMENTS/OBJECTS IN THE EARS   You should never clean your ears with a Q-tip, cotton tipped applicator, Zeynep pin, paper clip, safety pin, pen cap, or any other instrument. This will tend to push wax in deeper and pack the ear canal with wax. There is a high risk and danger of this practice, especially rupture of ear drum, dislocation or other damage to ossicles, and permanent, irreversible, and irreparable hearing loss. It may cause inflammation and irritation of the ear canal and cause itching or pain. I recommend only use of one the several ear wax removal kits available \"over the counter\" if you feel a need to try to remove ear wax. For example, Murine, Bausch and Lomb, NeilMed, or Debrox ear wax removal kits may be used for ear wax removal, as needed. No other methods should be self used for cleaning your ears.

## 2022-09-29 ENCOUNTER — TELEPHONE (OUTPATIENT)
Dept: FAMILY MEDICINE CLINIC | Age: 72
End: 2022-09-29

## 2022-09-29 NOTE — TELEPHONE ENCOUNTER
----- Message from aWylon Mayo sent at 9/28/2022  3:55 PM EDT -----  Subject: Referral Request    Reason for referral request? Patient requesting lab work for next   appointment   Provider patient wants to be referred to(if known):     Provider Phone Number(if known):     Additional Information for Provider?   ---------------------------------------------------------------------------  --------------  [id number] [facility] [employer]    [phone]; OK to leave message on voicemail  ---------------------------------------------------------------------------  --------------

## 2022-10-03 NOTE — TELEPHONE ENCOUNTER
Please make patient aware that her insurance may not cover a physical and thus may not cover her labs.   She needs to speak with her insurance carrier to see if she can have a physical.

## 2022-10-14 ENCOUNTER — OFFICE VISIT (OUTPATIENT)
Dept: FAMILY MEDICINE CLINIC | Age: 72
End: 2022-10-14
Payer: MEDICARE

## 2022-10-14 VITALS
DIASTOLIC BLOOD PRESSURE: 84 MMHG | BODY MASS INDEX: 18.84 KG/M2 | HEART RATE: 73 BPM | SYSTOLIC BLOOD PRESSURE: 148 MMHG | WEIGHT: 117.2 LBS | HEIGHT: 66 IN

## 2022-10-14 DIAGNOSIS — Z00.00 INITIAL MEDICARE ANNUAL WELLNESS VISIT: Primary | ICD-10-CM

## 2022-10-14 DIAGNOSIS — R03.0 WHITE COAT SYNDROME WITH HIGH BLOOD PRESSURE BUT WITHOUT HYPERTENSION: ICD-10-CM

## 2022-10-14 PROCEDURE — 1123F ACP DISCUSS/DSCN MKR DOCD: CPT | Performed by: FAMILY MEDICINE

## 2022-10-14 PROCEDURE — G0438 PPPS, INITIAL VISIT: HCPCS | Performed by: FAMILY MEDICINE

## 2022-10-14 ASSESSMENT — LIFESTYLE VARIABLES
HOW MANY STANDARD DRINKS CONTAINING ALCOHOL DO YOU HAVE ON A TYPICAL DAY: 1 OR 2
HOW OFTEN DO YOU HAVE A DRINK CONTAINING ALCOHOL: MONTHLY OR LESS

## 2022-10-14 ASSESSMENT — PATIENT HEALTH QUESTIONNAIRE - PHQ9
1. LITTLE INTEREST OR PLEASURE IN DOING THINGS: 0
SUM OF ALL RESPONSES TO PHQ9 QUESTIONS 1 & 2: 0
SUM OF ALL RESPONSES TO PHQ QUESTIONS 1-9: 0
2. FEELING DOWN, DEPRESSED OR HOPELESS: 0
SUM OF ALL RESPONSES TO PHQ QUESTIONS 1-9: 0

## 2022-10-14 NOTE — PROGRESS NOTES
Medicare Annual Wellness Visit    Tim Lamar is here for Medicare AWV    Assessment & Plan    Recommendations for Preventive Services Due: see orders and patient instructions/AVS.  Recommended screening schedule for the next 5-10 years is provided to the patient in written form: see Patient Instructions/AVS.     Return for Medicare Annual Wellness Visit in 1 year. Subjective   The following acute and/or chronic problems were also addressed today:    Patient's complete Health Risk Assessment and screening values have been reviewed and are found in Flowsheets. The following problems were reviewed today and where indicated follow up appointments were made and/or referrals ordered. White coat hypertension   BP log from home is wnl. BP was improving in room  Very nervous    Positive Risk Factor Screenings with Interventions:             General Health and ACP:  General  In general, how would you say your health is?: Very Good  In the past 7 days, have you experienced any of the following: New or Increased Pain, New or Increased Fatigue, Loneliness, Social Isolation, Stress or Anger?: No  Do you get the social and emotional support that you need?: Yes  Do you have a Living Will?: Yes    Advance Directives       Power of  Living Will ACP-Advance Directive ACP-Power of     Not on File Not on File Not on File Not on File        General Health Risk Interventions:  Doing wel              Objective   Vitals:    10/14/22 1256 10/14/22 1315   BP: (!) 171/73 (!) 148/84   Pulse: 73    Weight: 117 lb 3.2 oz (53.2 kg)    Height: 5' 6\" (1.676 m)       Body mass index is 18.92 kg/m². NO PE       No Known Allergies  Prior to Visit Medications    Medication Sig Taking?  Authorizing Provider   pseudoephedrine-guaiFENesin 120-1200 MG TB12 Take 1 tablet by mouth 2 times daily as needed (Congestion)  Patient not taking: Reported on 10/14/2022  Hardy Coelho,    triamcinolone (KENALOG) 0.1 % cream Apply topically  Daily. Apply with Q tip in each ear at bedtime  Patient not taking: Reported on 10/14/2022  Wendi Kellogg MD       [facility] (Including outside providers/suppliers regularly involved in providing care):   Patient Care Team:  Piper Cerna DO as PCP - General (Family Medicine)  Piper Cerna DO as PCP - [facility] Empaneled Provider  Cha Rojas MD as Surgeon (Orthopedic Surgery)  Mayte Licona MD as Consulting Physician (Otolaryngology)  Wendi Kellogg MD as Consulting Physician (Otolaryngology)  Flor Carreno MD as Consulting Physician (Vascular Surgery)     Reviewed and updated this visit:  Tobacco  Allergies  Meds  Problems  Med Hx  Surg Hx  Soc Hx  Fam Hx               Medicare Annual Wellness Visit    Chavo De Jesus is here for Medicare AWV    Assessment & Plan   Initial Medicare annual wellness visit  -     CBC; Future  -     Comprehensive Metabolic Panel; Future  -     Lipid Panel; Future  White coat syndrome with high blood pressure but without hypertension    Recommendations for Preventive Services Due: see orders and patient instructions/AVS.  Recommended screening schedule for the next 5-10 years is provided to the patient in written form: see Patient Instructions/AVS.     Return for Medicare Annual Wellness Visit in 1 year. Subjective   The following acute and/or chronic problems were also addressed today:  No other problems today  Wants labs  Informed her may not be covered due to AWV policies   She will talk to insurance. Patient's complete Health Risk Assessment and screening values have been reviewed and are found in Flowsheets. The following problems were reviewed today and where indicated follow up appointments were made and/or referrals ordered.     Positive Risk Factor Screenings with Interventions:             General Health and ACP:  General  In general, how would you say your health is?: Very Good  In the past 7 days, have you experienced any of the following: New or Increased Pain, New or Increased Fatigue, Loneliness, Social Isolation, Stress or Anger?: No  Do you get the social and emotional support that you need?: Yes  Do you have a Living Will?: Yes    Advance Directives       Power of Layo Collins Will ACP-Advance Directive ACP-Power of     Not on File Not on File Not on File Not on File          General Health Risk Interventions:  No problmes              Objective   Vitals:    10/14/22 1256 10/14/22 1315   BP: (!) 171/73 (!) 148/84   Pulse: 73    Weight: 117 lb 3.2 oz (53.2 kg)    Height: 5' 6\" (1.676 m)       Body mass index is 18.92 kg/m². NO PE       No Known Allergies  Prior to Visit Medications    Medication Sig Taking? Authorizing Provider   pseudoephedrine-guaiFENesin 120-1200 MG TB12 Take 1 tablet by mouth 2 times daily as needed (Congestion)  Patient not taking: Reported on 10/14/2022  Emilee Coelho DO   triamcinolone (KENALOG) 0.1 % cream Apply topically  Daily. Apply with Q tip in each ear at bedtime  Patient not taking: Reported on 10/14/2022  Wendi Kellogg MD       [facility] (Including outside providers/suppliers regularly involved in providing care):   Patient Care Team:  Piper Cerna DO as PCP - General (Family Medicine)  Piper Cerna DO as PCP - [facility] Empaneled Provider  Cha Rojas MD as Surgeon (Orthopedic Surgery)  Mayte Licona MD as Consulting Physician (Otolaryngology)  Wendi Kellogg MD as Consulting Physician (Otolaryngology)  Flor Carreno MD as Consulting Physician (Vascular Surgery)     Reviewed and updated this visit:  Tobacco  Allergies  Meds  Problems  Med Hx  Surg Hx  Soc Hx  Fam Hx

## 2022-10-15 PROBLEM — R03.0 WHITE COAT SYNDROME WITH HIGH BLOOD PRESSURE BUT WITHOUT HYPERTENSION: Status: ACTIVE | Noted: 2022-10-15

## 2022-10-15 NOTE — PATIENT INSTRUCTIONS
Personalized Preventive Plan for Alex Varela - 10/14/2022  Medicare offers a range of preventive health benefits. Some of the tests and screenings are paid in full while other may be subject to a deductible, co-insurance, and/or copay. Some of these benefits include a comprehensive review of your medical history including lifestyle, illnesses that may run in your family, and various assessments and screenings as appropriate. After reviewing your medical record and screening and assessments performed today your provider may have ordered immunizations, labs, imaging, and/or referrals for you. A list of these orders (if applicable) as well as your Preventive Care list are included within your After Visit Summary for your review. Other Preventive Recommendations:    A preventive eye exam performed by an eye specialist is recommended every 1-2 years to screen for glaucoma; cataracts, macular degeneration, and other eye disorders. A preventive dental visit is recommended every 6 months. Try to get at least 150 minutes of exercise per week or 10,000 steps per day on a pedometer . Order or download the FREE \"Exercise & Physical Activity: Your Everyday Guide\" from [employer]. Call 9-450.446.8703 or search [employer] online. You need 8589-3067 mg of calcium and 5677-2271 IU of vitamin D per day. It is possible to meet your calcium requirement with diet alone, but a vitamin D supplement is usually necessary to meet this goal.  When exposed to the sun, use a sunscreen that protects against both UVA and UVB radiation with an SPF of 30 or greater. Reapply every 2 to 3 hours or after sweating, drying off with a towel, or swimming. Always wear a seat belt when traveling in a car. Always wear a helmet when riding a bicycle or motorcycle.

## 2022-11-14 ENCOUNTER — TELEPHONE (OUTPATIENT)
Dept: ENT CLINIC | Age: 72
End: 2022-11-14

## 2022-11-14 NOTE — TELEPHONE ENCOUNTER
Spoke to pt, advised pt Dr Donald Noel had an opening in the 16th at 10:40 but she is unable to make this time, pt stated that she would hold off for now and call if she starts to develop any kind of symptoms, Pt would like Dr Cheryln Hammans to know as well.

## 2022-11-14 NOTE — TELEPHONE ENCOUNTER
Patient is calling to see if she can talk to Dr. Jose Diez regarding her dome. She states it is missing she does not know if its in her ear or if it fell off.

## 2023-03-02 LAB — MAMMOGRAPHY, EXTERNAL: NORMAL

## 2023-03-31 ENCOUNTER — TELEPHONE (OUTPATIENT)
Dept: ENT CLINIC | Age: 73
End: 2023-03-31

## 2023-03-31 NOTE — TELEPHONE ENCOUNTER
Pt.calling in states she always over the dome covers for her hearings aids from Dr. Lesly Gardner she did buy the hearing aids from [facility] She would like to know if she can purchase them from you now. ? And if so she would like a call back she states she will be in office for a office visit with Dr. Burak Douglas 4/5/23 in the afternoon.

## 2023-04-03 NOTE — TELEPHONE ENCOUNTER
Returned patient's call. Advised she could  domes on 4/5 at $8.00. Will pay at . Call with further questions.

## 2023-04-05 ENCOUNTER — OFFICE VISIT (OUTPATIENT)
Dept: AUDIOLOGY | Age: 73
End: 2023-04-05

## 2023-04-05 ENCOUNTER — OFFICE VISIT (OUTPATIENT)
Dept: ENT CLINIC | Age: 73
End: 2023-04-05
Payer: MEDICARE

## 2023-04-05 VITALS
WEIGHT: 116 LBS | HEIGHT: 66 IN | TEMPERATURE: 97.9 F | OXYGEN SATURATION: 96 % | HEART RATE: 66 BPM | BODY MASS INDEX: 18.64 KG/M2 | SYSTOLIC BLOOD PRESSURE: 165 MMHG | DIASTOLIC BLOOD PRESSURE: 67 MMHG

## 2023-04-05 DIAGNOSIS — H61.23 EXCESSIVE CERUMEN IN BOTH EAR CANALS: Primary | ICD-10-CM

## 2023-04-05 DIAGNOSIS — H90.0 CONDUCTIVE HEARING LOSS OF BOTH EARS: ICD-10-CM

## 2023-04-05 DIAGNOSIS — H61.23 IMPACTED CERUMEN OF BOTH EARS: ICD-10-CM

## 2023-04-05 DIAGNOSIS — H90.3 SENSORINEURAL HEARING LOSS (SNHL) OF BOTH EARS: Primary | ICD-10-CM

## 2023-04-05 PROCEDURE — 69210 REMOVE IMPACTED EAR WAX UNI: CPT | Performed by: OTOLARYNGOLOGY

## 2023-04-05 NOTE — PROGRESS NOTES
Chief Complaint   Patient presents with    Cerumen Impaction    Hearing Loss       HISTORY:    Beatrice Welsh stated that the hearing is decreased in both ears, which are plugged up with ear wax. EXAMINATION:    Both external ear canals were partially occluded by cerumen impaction, obscuring complete visualization of the TMs. PROCEDURE - REMOVAL OF BILATERAL CERUMEN IMPACTION:   The cerumen impaction was removed from both of the EACs, under otomicroscopy visualization, with instrumentation, using a Billeau wire loop. After successful cerumen removal, the EACs appeared to be normal and clear bilaterally without mass, exudate, or edema. The tympanic membranes appeared to be normal, including normal pneumatic mobility bilaterally. There was no evidence of acute disease. Beatrice Welsh reported improved hearing, back to usual level, after cerumen removal.          IMPRESSION / Jing Tamayo / João Sports / PROCEDURES:       Beatrice Welsh was seen today for cerumen impaction and hearing loss. Diagnoses and all orders for this visit:    Excessive cerumen in both ear canals    Impacted cerumen of both ears    Conductive hearing loss of both ears        RECOMMENDATIONS / PLAN:   See Patient Instructions on file for this visit. Return for recurrence of symptoms of excessive ear wax, or any other ear, nose, throat, or sinus problems.

## 2023-04-05 NOTE — PROGRESS NOTES
Tim Willard  [birth year]16 y.o. female   [id number]    HEARING AID CHECK SUPPLIES     Tim Lamar was seen today, 4/5/2023, for a hearing aid supplies     PROCEDURES:     Patient picked 8pkg of medium open ReSound domes. RECOMMENDATIONS:     Continue consistent hearing aid use  Return for hearing aid checks as needed  Retest hearing as medically indicated and/or sooner if a change in hearing is noted.   Contact audiologist with questions/concerns as needed      **Patient paid $8.00 for domes at time of visit     [facility], [facility]  Audiologist

## 2023-07-07 ENCOUNTER — PROCEDURE VISIT (OUTPATIENT)
Dept: AUDIOLOGY | Age: 73
End: 2023-07-07

## 2023-07-07 DIAGNOSIS — H90.3 SENSORINEURAL HEARING LOSS (SNHL) OF BOTH EARS: Primary | ICD-10-CM

## 2023-07-07 PROCEDURE — 99999 PR OFFICE/OUTPT VISIT,PROCEDURE ONLY: CPT | Performed by: AUDIOLOGIST

## 2023-07-07 NOTE — PROGRESS NOTES
Neelam Hand  [birth year]83 y.o. female   [id number]    HEARING [address] was seen today, 7/7/2023, for a hearing aid check appointment,    PROCEDURES:     Otoscopy revealed: Clear ear canals bilaterally    Patient noted feeling need to \"turn up devices\". Inquired about programming devices vs. New devices. Hearing screening completed. See results below. Hearing decreased compared to 2018 audiogram.      Hearing aids were cleaned and checked. A listening check revealed good function of the devices. Microphone and  ports were suctioned, domes and wax traps were changed, and devices completed a desiccant cycle through [employer]. Post-cleaning listening check revealed good function of the devices. Connected devices to fitting software. Re-programmed to devices to today's evaluation and changed to tulip dome. After adjustments, patient reported comfort and improved sound quality. PATIENT EDUCATION:     - Verbally and visually reviewed importance of consistent use and care and maintenance of devices. Information was verbally shared with patient during appointment. RECOMMENDATIONS:     Continue consistent hearing aid use  Return for hearing aid checks as needed  Retest hearing as medically indicated and/or sooner if a change in hearing is noted.   Contact audiologist with questions/concerns as needed    **No charge for today's appointment    [facility] [place], [place]  Audiologist

## 2023-08-02 ENCOUNTER — PROCEDURE VISIT (OUTPATIENT)
Dept: AUDIOLOGY | Age: 73
End: 2023-08-02

## 2023-08-02 DIAGNOSIS — H90.3 SENSORINEURAL HEARING LOSS (SNHL) OF BOTH EARS: Primary | ICD-10-CM

## 2023-08-02 PROCEDURE — NBSRV NON-BILLABLE SERVICE

## 2023-09-11 ENCOUNTER — TELEMEDICINE (OUTPATIENT)
Dept: FAMILY MEDICINE CLINIC | Age: 73
End: 2023-09-11
Payer: MEDICARE

## 2023-09-11 DIAGNOSIS — G47.30 SLEEP APNEA, UNSPECIFIED TYPE: Primary | ICD-10-CM

## 2023-09-11 PROCEDURE — 99213 OFFICE O/P EST LOW 20 MIN: CPT | Performed by: FAMILY MEDICINE

## 2023-09-11 RX ORDER — CONJUGATED ESTROGENS 0.62 MG/G
CREAM VAGINAL
COMMUNITY

## 2023-09-11 RX ORDER — TRIAMCINOLONE ACETONIDE 1 MG/G
CREAM TOPICAL
COMMUNITY

## 2023-09-11 SDOH — ECONOMIC STABILITY: HOUSING INSECURITY
IN THE LAST 12 MONTHS, WAS THERE A TIME WHEN YOU DID NOT HAVE A STEADY PLACE TO SLEEP OR SLEPT IN A SHELTER (INCLUDING NOW)?: NO

## 2023-09-11 SDOH — ECONOMIC STABILITY: FOOD INSECURITY: WITHIN THE PAST 12 MONTHS, THE FOOD YOU BOUGHT JUST DIDN'T LAST AND YOU DIDN'T HAVE MONEY TO GET MORE.: NEVER TRUE

## 2023-09-11 SDOH — ECONOMIC STABILITY: INCOME INSECURITY: HOW HARD IS IT FOR YOU TO PAY FOR THE VERY BASICS LIKE FOOD, HOUSING, MEDICAL CARE, AND HEATING?: NOT HARD AT ALL

## 2023-09-11 SDOH — ECONOMIC STABILITY: FOOD INSECURITY: WITHIN THE PAST 12 MONTHS, YOU WORRIED THAT YOUR FOOD WOULD RUN OUT BEFORE YOU GOT MONEY TO BUY MORE.: NEVER TRUE

## 2023-09-11 SDOH — ECONOMIC STABILITY: TRANSPORTATION INSECURITY
IN THE PAST 12 MONTHS, HAS LACK OF TRANSPORTATION KEPT YOU FROM MEETINGS, WORK, OR FROM GETTING THINGS NEEDED FOR DAILY LIVING?: NO

## 2023-09-11 ASSESSMENT — ENCOUNTER SYMPTOMS
ABDOMINAL PAIN: 0
APNEA: 1
DIARRHEA: 0
VOMITING: 0
SHORTNESS OF BREATH: 0
NAUSEA: 0

## 2023-09-11 ASSESSMENT — PATIENT HEALTH QUESTIONNAIRE - PHQ9
SUM OF ALL RESPONSES TO PHQ9 QUESTIONS 1 & 2: 0
2. FEELING DOWN, DEPRESSED OR HOPELESS: 0
1. LITTLE INTEREST OR PLEASURE IN DOING THINGS: 0
SUM OF ALL RESPONSES TO PHQ QUESTIONS 1-9: 0

## 2023-09-11 NOTE — PROGRESS NOTES
2023    TELEHEALTH EVALUATION -- Audio/Visual    HPI:    Hue Early (:  1950) has requested an audio/video evaluation for the following concern(s):    Sleep apnea  Patient has sleep apnea  Snoring   Gasping for breath at night  Fatigue  Today, she denied chest pain, sob, n, v or diarrhea. Review of Systems   Constitutional:  Positive for fatigue. Negative for activity change. Respiratory:  Positive for apnea. Negative for shortness of breath. Cardiovascular:  Negative for chest pain. Gastrointestinal:  Negative for abdominal pain, diarrhea, nausea and vomiting. Prior to Visit Medications    Medication Sig Taking? Authorizing Provider   estrogens conjugated (PREMARIN) 0.625 MG/GM CREA vaginal cream  Yes Historical Provider, MD   triamcinolone (KENALOG) 0.1 % cream  Yes Historical Provider, MD       Social History     Tobacco Use    Smoking status: Never    Smokeless tobacco: Never   Substance Use Topics    Alcohol use: Yes     Comment: occassionally    Drug use: No        No Known Allergies    PHYSICAL EXAMINATION:  [ INSTRUCTIONS:  \"[x]\" Indicates a positive item  \"[]\" Indicates a negative item  -- DELETE ALL ITEMS NOT EXAMINED]  Vital Signs: (As obtained by patient/caregiver or practitioner observation)  See chart  Blood pressure-  Heart rate-    Respiratory rate-    Temperature-  Pulse oximetry-     Constitutional: [x] Appears well-developed and well-nourished [] No apparent distress      [] Abnormal-   Mental status  [x] Alert and awake  [] Oriented to person/place/time []Able to follow commands      Eyes:  EOM    [x]  Normal  [] Abnormal-  Sclera  []  Normal  [] Abnormal -         Discharge []  None visible  [] Abnormal -    HENT:   [x] Normocephalic, atraumatic.   [] Abnormal   [] Mouth/Throat: Mucous membranes are moist.     External Ears [x] Normal  [] Abnormal-     Neck: [x] No visualized mass     Pulmonary/Chest: [x] Respiratory effort normal.  [] No visualized signs of

## 2024-01-29 ENCOUNTER — OFFICE VISIT (OUTPATIENT)
Dept: SLEEP MEDICINE | Age: 74
End: 2024-01-29
Payer: MEDICARE

## 2024-01-29 VITALS
OXYGEN SATURATION: 99 % | WEIGHT: 114.6 LBS | RESPIRATION RATE: 18 BRPM | HEART RATE: 79 BPM | TEMPERATURE: 96.9 F | HEIGHT: 65 IN | SYSTOLIC BLOOD PRESSURE: 130 MMHG | DIASTOLIC BLOOD PRESSURE: 80 MMHG | BODY MASS INDEX: 19.09 KG/M2

## 2024-01-29 DIAGNOSIS — R06.83 SNORING: ICD-10-CM

## 2024-01-29 DIAGNOSIS — G47.33 OSA (OBSTRUCTIVE SLEEP APNEA): Primary | ICD-10-CM

## 2024-01-29 DIAGNOSIS — G47.19 EXCESSIVE DAYTIME SLEEPINESS: ICD-10-CM

## 2024-01-29 DIAGNOSIS — R06.89 GASPING FOR BREATH: ICD-10-CM

## 2024-01-29 DIAGNOSIS — M85.88 MASS OF HARD PALATE: ICD-10-CM

## 2024-01-29 PROCEDURE — 99204 OFFICE O/P NEW MOD 45 MIN: CPT | Performed by: PSYCHIATRY & NEUROLOGY

## 2024-01-29 PROCEDURE — 1123F ACP DISCUSS/DSCN MKR DOCD: CPT | Performed by: PSYCHIATRY & NEUROLOGY

## 2024-01-29 ASSESSMENT — SLEEP AND FATIGUE QUESTIONNAIRES
HOW LIKELY ARE YOU TO NOD OFF OR FALL ASLEEP IN A CAR, WHILE STOPPED FOR A FEW MINUTES IN TRAFFIC: 0
NECK CIRCUMFERENCE (INCHES): 13
HOW LIKELY ARE YOU TO NOD OFF OR FALL ASLEEP WHILE SITTING INACTIVE IN A PUBLIC PLACE: 1
HOW LIKELY ARE YOU TO NOD OFF OR FALL ASLEEP WHILE SITTING QUIETLY AFTER LUNCH WITHOUT ALCOHOL: 1
HOW LIKELY ARE YOU TO NOD OFF OR FALL ASLEEP WHILE LYING DOWN TO REST IN THE AFTERNOON WHEN CIRCUMSTANCES PERMIT: 2
HOW LIKELY ARE YOU TO NOD OFF OR FALL ASLEEP WHILE WATCHING TV: 1
HOW LIKELY ARE YOU TO NOD OFF OR FALL ASLEEP WHILE SITTING AND READING: 1
ESS TOTAL SCORE: 7
HOW LIKELY ARE YOU TO NOD OFF OR FALL ASLEEP WHILE SITTING AND TALKING TO SOMEONE: 0
HOW LIKELY ARE YOU TO NOD OFF OR FALL ASLEEP WHEN YOU ARE A PASSENGER IN A CAR FOR AN HOUR WITHOUT A BREAK: 1

## 2024-01-29 ASSESSMENT — ENCOUNTER SYMPTOMS
GASTROINTESTINAL NEGATIVE: 1
CHOKING: 1
EYES NEGATIVE: 1

## 2024-01-29 NOTE — PROGRESS NOTES
MD [facility] Doe Board Certified in Sleep Medicine  [facility]  Board Certified in Neurology [facility]  [address]   [address]  [place]  [address]  P- [phone]   [facility]   [address]  [address]   [place], [place] [address]           [facility] [place] [facility]  [address] [id number] [phone]    Subjective:     Patient ID: Nikky Godwin is a 73 y.o. female.    Chief Complaint   Patient presents with    Establish Care    Snoring       HPI:        Nikky Godwin is a 73 y.o. female referred by Dr. Sy for a sleep evaluation. She complains of snoring, snorting, choking, tossing and turning, fatigue but she denies periods of not breathing, knees buckling with laughing, completely or partially paralyzed while falling asleep or waking up, take naps during the day, noisy environment, uncomfortable room temperature, uncomfortable bedding.  Symptoms began a few years ago, gradually worsening since that time.   The patient's bed-partner confirmed the snoring without the stopped breathing at night.  SLEEP SCHEDULE: Goes to bed around 1 PM in the weekdays and 11 PM in the weekends. It usually takes the patient 10 minutes to fall asleep. The patient gets up 0 per night to go to the bathroom. The Patient finally gets up at 8 AM during the weekdays and 7 AM in the weekends. patient wakes up with dry mouth.  The patient has restless sleep with frequent arousals in addition to the Patient has significant daytime sleepiness. The Patient scored Blair Sleepiness Score: 7 on Blair Sleepiness Scale ( more than 10 is indicative of daytime sleepiness)and 13 in fatigue scale ( more than 36 is indicative of daytime fatigue). The patient takes no naps.    Previous evaluation and treatment has included- none.  DOT/CDL - N/A  FAA/'milana -

## 2024-01-29 NOTE — PATIENT INSTRUCTIONS
Orders Placed This Encounter   Procedures    Home Sleep Study     Standing Status:   Future     Standing Expiration Date:   1/29/2025     Order Specific Question:   Location For Sleep Study     Answer:   [place]     Order Specific Question:   Select Sleep Lab Location     Answer:   [facility]

## 2025-01-31 ENCOUNTER — TELEPHONE (OUTPATIENT)
Dept: SLEEP MEDICINE | Age: 75
End: 2025-01-31

## 2025-01-31 NOTE — TELEPHONE ENCOUNTER
Pt called wants to know can she have another order put in for her sleep study she had a lot of things going on last year that prevented her from completing test, or does she need a office visit?

## 2025-02-03 DIAGNOSIS — G47.33 OSA (OBSTRUCTIVE SLEEP APNEA): Primary | ICD-10-CM

## 2025-02-03 NOTE — TELEPHONE ENCOUNTER
The patient has been notified of this information and all questions answered.    Transferred to sleep lab

## 2025-02-10 ENCOUNTER — HOSPITAL ENCOUNTER (OUTPATIENT)
Dept: SLEEP CENTER | Age: 75
Discharge: HOME | End: 2025-02-10
Attending: PSYCHIATRY & NEUROLOGY
Payer: MEDICARE

## 2025-02-10 DIAGNOSIS — G47.33 OSA (OBSTRUCTIVE SLEEP APNEA): ICD-10-CM

## 2025-02-10 PROCEDURE — 95806 SLEEP STUDY UNATT&RESP EFFT: CPT

## 2025-02-12 DIAGNOSIS — G47.33 OSA (OBSTRUCTIVE SLEEP APNEA): Primary | ICD-10-CM

## 2025-02-12 PROBLEM — G47.10 HYPERSOMNIA: Status: ACTIVE | Noted: 2025-02-12

## 2025-02-13 ENCOUNTER — PATIENT MESSAGE (OUTPATIENT)
Dept: PULMONOLOGY | Age: 75
End: 2025-02-13

## 2025-02-13 ENCOUNTER — TELEPHONE (OUTPATIENT)
Dept: PULMONOLOGY | Age: 75
End: 2025-02-13

## 2025-02-13 NOTE — TELEPHONE ENCOUNTER
Home Sleep study showed no significant sleep disordered breathing.  AHI was 3.8  per hr. (Average times per hr breathing was obstructed).  O2 Desaturations to 91% (lowest o2)   Dr Recommends:    Follow up with the patient's sleep physician to discuss results      Avoid sedatives, alcohol and caffeinated drinks at bedtime.    Recommend:  In Lab Study     Sleep Lab used: [facility]    Avoid driving while sleepy.       The patient has been notified of this information and all questions answered.    She will call sleep lab to schedule later.

## 2025-02-20 ENCOUNTER — HOSPITAL ENCOUNTER (OUTPATIENT)
Dept: SLEEP CENTER | Age: 75
Discharge: HOME | End: 2025-02-20
Attending: PSYCHIATRY & NEUROLOGY
Payer: MEDICARE

## 2025-02-20 DIAGNOSIS — G47.33 OSA (OBSTRUCTIVE SLEEP APNEA): ICD-10-CM

## 2025-02-20 PROCEDURE — 95810 POLYSOM 6/> YRS 4/> PARAM: CPT

## 2025-02-25 NOTE — TELEPHONE ENCOUNTER
No significant sleep disordered breathing.    At this point CPAP, does not appear to be indicated.  However, should symptomatology progress a repeat evaluation may be appropriate.         1/29/2024 referred by Dr. Sy for a sleep evaluation. She complains of snoring, snorting, choking, tossing and turning, fatigue